# Patient Record
Sex: MALE | Race: WHITE | NOT HISPANIC OR LATINO
[De-identification: names, ages, dates, MRNs, and addresses within clinical notes are randomized per-mention and may not be internally consistent; named-entity substitution may affect disease eponyms.]

---

## 2021-03-18 ENCOUNTER — APPOINTMENT (OUTPATIENT)
Age: 51
End: 2021-03-18

## 2022-05-04 ENCOUNTER — FORM ENCOUNTER (OUTPATIENT)
Age: 52
End: 2022-05-04

## 2022-05-04 ENCOUNTER — APPOINTMENT (OUTPATIENT)
Dept: ORTHOPEDIC SURGERY | Facility: CLINIC | Age: 52
End: 2022-05-04
Payer: COMMERCIAL

## 2022-05-04 VITALS — HEIGHT: 70 IN | WEIGHT: 195 LBS | BODY MASS INDEX: 27.92 KG/M2

## 2022-05-04 DIAGNOSIS — Z78.9 OTHER SPECIFIED HEALTH STATUS: ICD-10-CM

## 2022-05-04 PROBLEM — Z00.00 ENCOUNTER FOR PREVENTIVE HEALTH EXAMINATION: Status: ACTIVE | Noted: 2022-05-04

## 2022-05-04 PROCEDURE — 73070 X-RAY EXAM OF ELBOW: CPT | Mod: RT

## 2022-05-04 PROCEDURE — 99203 OFFICE O/P NEW LOW 30 MIN: CPT

## 2022-05-04 RX ORDER — ESCITALOPRAM OXALATE 10 MG/1
10 TABLET, FILM COATED ORAL
Refills: 0 | Status: ACTIVE | COMMUNITY

## 2022-05-05 ENCOUNTER — APPOINTMENT (OUTPATIENT)
Dept: MRI IMAGING | Facility: CLINIC | Age: 52
End: 2022-05-05
Payer: COMMERCIAL

## 2022-05-05 PROCEDURE — 73221 MRI JOINT UPR EXTREM W/O DYE: CPT | Mod: RT

## 2022-05-05 NOTE — REASON FOR VISIT
[FreeTextEntry2] : 5/4/22: injured during [employer] race on Sunday. Felt a pop and some ecchymosis anterior forearm. Denies n/t. Denies hx of right elbow issues. +ice, cbd oil , and Tylenol.

## 2022-05-05 NOTE — DISCUSSION/SUMMARY
[de-identified] : The documentation recorded by the scribe accurately reflects the service I personally performed and the decisions made by me.\par I, Sony Briones, attest that this documentation has been prepared under the direction and in the presence of Provider Ned Russo MD.\par \par The patient was seen by Ned Russo MD\par

## 2022-05-05 NOTE — ASSESSMENT
[FreeTextEntry1] : 5/5/22: Xrays right elbow negative. \par no palpable muscle belly. \par STAT MRI R elbow r/o distal biceps tear. \par Referred to Dr. Mckenna for after MRI\par He may require surgical intervention. \par

## 2022-05-05 NOTE — PHYSICAL EXAM
[Orientated] : orientated [Able to Communicate] : able to communicate [Normal Skin] : normal skin [Right] : right elbow [] : light touch intact [FreeTextEntry3] : ecchymosis mild anterior forearm [de-identified] : not palpable muscle belly.

## 2022-05-05 NOTE — HISTORY OF PRESENT ILLNESS
[Sudden] : sudden [5] : 5 [4] : 4 [Dull/Aching] : dull/aching [Sharp] : sharp [Constant] : constant [] : no [FreeTextEntry5] : pt felt a pop in the arm Sunday during a [employer] Race. [FreeTextEntry7] : from the shoulder to the elbow

## 2022-05-06 ENCOUNTER — TRANSCRIPTION ENCOUNTER (OUTPATIENT)
Age: 52
End: 2022-05-06

## 2022-05-10 ENCOUNTER — APPOINTMENT (OUTPATIENT)
Dept: ORTHOPEDIC SURGERY | Facility: CLINIC | Age: 52
End: 2022-05-10
Payer: COMMERCIAL

## 2022-05-10 VITALS — BODY MASS INDEX: 27.92 KG/M2 | WEIGHT: 195 LBS | HEIGHT: 70 IN

## 2022-05-10 DIAGNOSIS — Z78.9 OTHER SPECIFIED HEALTH STATUS: ICD-10-CM

## 2022-05-10 PROCEDURE — 99214 OFFICE O/P EST MOD 30 MIN: CPT

## 2022-05-10 NOTE — ASSESSMENT
[FreeTextEntry1] : MRI Right Elbow Distal Biceps 5/5/22 [facility]\par Impression:\par 1. Acute appearing complete disruption of the biceps tendon insertion with 4-5 cm of retraction with severe \par edema, inflammatory change and soft tissue swelling within and surrounding the myotendinous complex and \par retracted tendon with volar, medial, and dorsal soft tissue swelling throughout the elbow.\par 2. Moderate grade lateral epicondylitis with partial tearing measuring 6 mm at the common extensor tendon \par insertion.\par 3. Mild medial epicondylitis.\par 4. No acute fracture, malalignment, ligament tear, effusion or loose body.\par 5. Clinical correlation is recommended.

## 2022-05-10 NOTE — PHYSICAL EXAM
[de-identified] : Neurologic: normal coordination, normal DTR UE/LE , normal sensation, normal mood and affect, orientated and able to communicate. \par Skin: normal skin, no rash, no ulcers and no lesions. \par Lymphatic: no obvious lymphadenopathy in areas examined. \par Constitutional: well developed and well nourished. \par Cardiovascular: peripheral vascular exam is grossly normal. \par Pulmonary: no respiratory distress, lungs clear to auscultation bilaterally. \par Abdomen: normal bowel sounds, non-tender, no HSM and no mass. \par \par Right Elbow: Distal biceps tenderness\par +Hook test for complete biceps tear\par +Deformity\par Swelling to anterior elbow

## 2022-05-10 NOTE — HISTORY OF PRESENT ILLNESS
[de-identified] : The patient is a 51 year old right hand dominant male who presents today complaining of right elbow pain.  \par Date of Injury/Onset: 5/1/22\par Pain:    At Rest: 0/10 \par With Activity:  5/10 \par Mechanism of injury: was doing a [employer] race and felt a pop \par This is not a Work Related Injury being treated under Worker's Compensation.\par This is not an athletic injury occurring associated with an interscholastic or organized sports team.\par Quality of symptoms: Aching & Shooting pain \par Improves with: Pain meds\par Worse with: Overuse \par Prior treatment: Saw Dr. Russo in [place] \par Prior Imaging: MRI @ [facility]\par Out of work/sport:Working\par School/Sport/Position/Occupation: sales\par Additional Information: None\par

## 2022-05-10 NOTE — DISCUSSION/SUMMARY
[Surgical risks reviewed] : Surgical risks reviewed [de-identified] : Reviewed all images with patient.\par \par Conservative treatment, nontreatment, nonsurgical intervention and surgical intervention treatment options have been reviewed with the patient.  The patient continues to be symptomatic [and has failed conservative treatment], and elects to move forward with surgical intervention.  The patient is indicated for RIGHT DISTAL BICEPS REPAIR and all indicated procedures. As such the alternatives, benefits and risks, of the above procedure, including but not limited to bleeding, infection, neurovascular injury, loss of limb, loss of life,  DVT, PE, RSD, inability to return to previous level of activity, inability to return to previous level of employment, advancement of or to osteoarthritic changes, joint instability or motion loss, hardware failure or migration, [malunion or nonunion,] failure to resolve all symptoms, failure to return to sports and need for further procedures, as well as specific risk of RE-TEAR, HO, PIN PALSY were discussed with the patient and/or their legal guardian who agreed to move forward with surgical intervention.  They have reviewed and signed the consent form today after expressing understanding of the above documented conversation. The patient or their representative will contact my office as instructed on the preoperative instruction sheet they received today to schedule surgery in a timely manner as discussed.\par \par As a post-operative protocol, I am prescribing an iceless cold/heat compression therapy device for at home use to be used 3-5 times per day at 40 degrees for 35 days as an alternative to pain medication. I would like my patient to begin with simultaneous cold & compression therapy at 10mm pressure. At the patients follow up I will determine whether they should continue with cold, or if they should transition to contrast cold/heat compression therapy. Unlike a conventional cold therapy unit that requires ice, the ThermX iceless device is set to a prescribed temperature that it will remain throughout the entire duration of use, whether that be cold compression, heat compression, or contrast compression. Cold therapy units that depend on ice melt over a very short period and do not provide compression which limits the compliance and effectiveness for pain/inflammation reduction that I am targeting for my patient. I have reached out to [employer] to supply this device as they are the exclusive provider of the ThermX and the patient will be contacted and instructed on how to utilize the device. \par \par -----------------------------------------------\par Home Exercise\par The patient is instructed on a home exercise program.\par \par KENZIE OPP Acting as a Scribe for Dr. Flores\par I, Kenzie Pop, attest that this documentation has been prepared under the direction and in the presence of Provider Louis Flores MD.\par \par Activity Modification\par The patient was advised to modify their activities.\par \par Dx / Natural History\par The patient was advised of the diagnosis.  The natural history of the pathology was explained in full to the patient in layman's terms.  Several different treatment options were discussed and explained in full to the patient including the risks and benefits of both surgical and non-surgical treatments.  All questions and concerns were answered.\par \par Pain Guide Activities\par The patient was advised to let pain guide the gradual advancement of activities.\par \par RICE\par I explained to the patient that rest, ice, compression, and elevation would benefit them.  They may return to activity after follow-up or when they no longer have any pain.

## 2022-05-16 ENCOUNTER — APPOINTMENT (OUTPATIENT)
Dept: ORTHOPEDIC SURGERY | Facility: CLINIC | Age: 52
End: 2022-05-16

## 2022-05-20 ENCOUNTER — APPOINTMENT (OUTPATIENT)
Dept: ORTHOPEDIC SURGERY | Facility: HOSPITAL | Age: 52
End: 2022-05-20
Payer: COMMERCIAL

## 2022-05-20 PROCEDURE — 24342 REPAIR OF RUPTURED TENDON: CPT | Mod: RT

## 2022-05-20 RX ORDER — DOCUSATE SODIUM 50 MG/1
50 CAPSULE, LIQUID FILLED ORAL TWICE DAILY
Qty: 15 | Refills: 0 | Status: ACTIVE | COMMUNITY
Start: 2022-05-20 | End: 1900-01-01

## 2022-05-20 RX ORDER — ONDANSETRON 4 MG/1
4 TABLET, ORALLY DISINTEGRATING ORAL
Qty: 15 | Refills: 0 | Status: ACTIVE | COMMUNITY
Start: 2022-05-20 | End: 1900-01-01

## 2022-05-20 RX ORDER — HYDROCODONE BITARTRATE AND ACETAMINOPHEN 5; 325 MG/1; MG/1
5-325 TABLET ORAL
Qty: 30 | Refills: 0 | Status: ACTIVE | COMMUNITY
Start: 2022-05-20 | End: 1900-01-01

## 2022-05-31 ENCOUNTER — APPOINTMENT (OUTPATIENT)
Dept: ORTHOPEDIC SURGERY | Facility: CLINIC | Age: 52
End: 2022-05-31
Payer: COMMERCIAL

## 2022-05-31 VITALS — BODY MASS INDEX: 27.92 KG/M2 | WEIGHT: 195 LBS | HEIGHT: 70 IN

## 2022-05-31 DIAGNOSIS — Z78.9 OTHER SPECIFIED HEALTH STATUS: ICD-10-CM

## 2022-05-31 DIAGNOSIS — S86.819A STRAIN OF OTHER MUSCLE(S) AND TENDON(S) AT LOWER LEG LEVEL, UNSPECIFIED LEG, INITIAL ENCOUNTER: ICD-10-CM

## 2022-05-31 PROCEDURE — 99024 POSTOP FOLLOW-UP VISIT: CPT

## 2022-05-31 NOTE — PHYSICAL EXAM
[de-identified] : Neurologic: normal coordination, normal DTR UE/LE , normal sensation, normal mood and affect, orientated and able to communicate. \par Skin: normal skin, no rash, no ulcers and no lesions. \par Lymphatic: no obvious lymphadenopathy in areas examined. \par Constitutional: well developed and well nourished. \par Cardiovascular: peripheral vascular exam is grossly normal. \par Pulmonary: no respiratory distress, lungs clear to auscultation bilaterally. \par Abdomen: normal bowel sounds, non-tender, no HSM and no mass.\par \par Right Elbow: No effusion, clean and dry incisions, intact skin, no fluctuance, no sign of infection, no wound erythema, no induration, no drainage. \par Sutures removed, Steri-Strips applied.

## 2022-05-31 NOTE — DISCUSSION/SUMMARY
[de-identified] : Inspected wound\par Removed sutures\par Applied steri-strips\par Reviewed all surgical images with patient and provided copies to take home\par Continue physical therapy\par Follow up in 6 weeks\par \par -----------------------------------------------\par Home Exercise\par The patient is instructed on a home exercise program.\par \par KENZIE POP Acting as a Scribe for Dr. Flores\Kenzie Coto, attest that this documentation has been prepared under the direction and in the presence of Provider Louis Flores MD.\par \par Activity Modification\par The patient was advised to modify their activities.\par \par Dx / Natural History\par The patient was advised of the diagnosis.  The natural history of the pathology was explained in full to the patient in layman's terms.  Several different treatment options were discussed and explained in full to the patient including the risks and benefits of both surgical and non-surgical treatments.  All questions and concerns were answered.\par \par Pain Guide Activities\par The patient was advised to let pain guide the gradual advancement of activities.\par \par RICE\par I explained to the patient that rest, ice, compression, and elevation would benefit them.  They may return to activity after follow-up or when they no longer have any pain. \par \par -----------------------------------------------\par Home Exercise\par The patient is instructed on a home exercise program.\par \par KENZIE POP Acting as a Scribe for Kenzie Pete, attest that this documentation has been prepared under the direction and in the presence of Provider Louis Flores MD.\par \par Activity Modification\par The patient was advised to modify their activities.\par \par Dx / Natural History\par The patient was advised of the diagnosis.  The natural history of the pathology was explained in full to the patient in layman's terms.  Several different treatment options were discussed and explained in full to the patient including the risks and benefits of both surgical and non-surgical treatments.  All questions and concerns were answered.\par \par Pain Guide Activities\par The patient was advised to let pain guide the gradual advancement of activities.\par \par RICE\par I explained to the patient that rest, ice, compression, and elevation would benefit them.  They may return to activity after follow-up or when they no longer have any pain.

## 2022-05-31 NOTE — HISTORY OF PRESENT ILLNESS
[de-identified] : The patient is 11 days s/p RIGHT BICEP REPAIR \par Date of Surgery: 5/20/22\par Pain:    At Rest: 4/10 \par With Activity:     4/10 \par Mechanism of injury: _\par This is NOT a Work Related Injury being treated under Worker's Compensation.\par This is NOT an athletic injury occurring associated with an interscholastic or organized sports team.\par Treatment/Imaging/Studies Since Last Visit:  SX \par 	Reports Available For Review Today:  NONE \par Out of work/sport: _, since _\par School/Sport/Position/Occupation: CURRENTLY WORKING FROM HOME \par Change since last visit: \par Additional Information: None\par

## 2022-07-12 ENCOUNTER — APPOINTMENT (OUTPATIENT)
Dept: ORTHOPEDIC SURGERY | Facility: CLINIC | Age: 52
End: 2022-07-12

## 2022-07-12 PROCEDURE — 99024 POSTOP FOLLOW-UP VISIT: CPT

## 2022-07-12 NOTE — DISCUSSION/SUMMARY
[de-identified] : Discontinue elbow brace\par Continue physical therapy\par rtc 6 weeks\par -----------------------------------------------\par Home Exercise\par The patient is instructed on a home exercise program.\par \par KENZIE POP Acting as a Scribe for Kenzie Pete, attest that this documentation has been prepared under the direction and in the presence of Provider Louis Flores MD.\par \par Activity Modification\par The patient was advised to modify their activities.\par \par Dx / Natural History\par The patient was advised of the diagnosis.  The natural history of the pathology was explained in full to the patient in layman's terms.  Several different treatment options were discussed and explained in full to the patient including the risks and benefits of both surgical and non-surgical treatments.  All questions and concerns were answered.\par \par Pain Guide Activities\par The patient was advised to let pain guide the gradual advancement of activities.\par \par RICE\par I explained to the patient that rest, ice, compression, and elevation would benefit them.  They may return to activity after follow-up or when they no longer have any pain. \par \par -----------------------------------------------\par Home Exercise\par The patient is instructed on a home exercise program.\par \par KENZIE POP Acting as a Scribe for Kenzie Pete, attest that this documentation has been prepared under the direction and in the presence of Provider Louis Flores MD.\par \par Activity Modification\par The patient was advised to modify their activities.\par \par Dx / Natural History\par The patient was advised of the diagnosis.  The natural history of the pathology was explained in full to the patient in layman's terms.  Several different treatment options were discussed and explained in full to the patient including the risks and benefits of both surgical and non-surgical treatments.  All questions and concerns were answered.\par \par Pain Guide Activities\par The patient was advised to let pain guide the gradual advancement of activities.\par \par RICE\par I explained to the patient that rest, ice, compression, and elevation would benefit them.  They may return to activity after follow-up or when they no longer have any pain.

## 2022-07-12 NOTE — HISTORY OF PRESENT ILLNESS
[de-identified] : The patient is s/p RIGHT BICEP REPAIR \par Date of Surgery: 5/20/22\par Pain: At Rest: 0/10 \par With Activity: 0/10 \par Mechanism of injury: _\par This is NOT a Work Related Injury being treated under Worker's Compensation.\par This is NOT an athletic injury occurring associated with an interscholastic or organized sports team.\par Treatment/Imaging/Studies Since Last Visit: xrom and PT\par 	Reports Available For Review Today: NONE \par Out of work/sport: _, since _\par School/Sport/Position/Occupation: CURRENTLY WORKING FROM HOME \par Change since last visit: \par Additional Information: None

## 2022-07-12 NOTE — PHYSICAL EXAM
[de-identified] : Neurologic: normal coordination, normal DTR UE/LE , normal sensation, normal mood and affect, orientated and able to communicate. \par Skin: normal skin, no rash, no ulcers and no lesions. \par Lymphatic: no obvious lymphadenopathy in areas examined. \par Constitutional: well developed and well nourished. \par Cardiovascular: peripheral vascular exam is grossly normal. \par Pulmonary: no respiratory distress, lungs clear to auscultation bilaterally. \par Abdomen: normal bowel sounds, non-tender, no HSM and no mass.\par \par Right Elbow: No tenderness\par Full ROM \par

## 2022-08-23 ENCOUNTER — APPOINTMENT (OUTPATIENT)
Dept: ORTHOPEDIC SURGERY | Facility: CLINIC | Age: 52
End: 2022-08-23
Payer: COMMERCIAL

## 2022-08-23 DIAGNOSIS — S46.211A STRAIN OF MUSCLE, FASCIA AND TENDON OF OTHER PARTS OF BICEPS, RIGHT ARM, INITIAL ENCOUNTER: ICD-10-CM

## 2022-08-23 DIAGNOSIS — M25.521 PAIN IN RIGHT ELBOW: ICD-10-CM

## 2022-08-23 DIAGNOSIS — S59.901A UNSPECIFIED INJURY OF RIGHT ELBOW, INITIAL ENCOUNTER: ICD-10-CM

## 2022-08-23 PROCEDURE — 99214 OFFICE O/P EST MOD 30 MIN: CPT

## 2022-08-23 PROCEDURE — 99213 OFFICE O/P EST LOW 20 MIN: CPT

## 2022-08-23 NOTE — DISCUSSION/SUMMARY
[de-identified] : Continue HEP and stretching. Advance activity as tolerated. Modify activity as discussed. Follow up PRN \par \par pt curling 20 pound dumbells pain free\par rtc prn\par \par ----------------------------------------------- \par \par MADIE SHAIKH, acting as a Scribe for Dr. Flores \par \par I, Madie Shaikh, attest that this documentation has been prepared under the direction and in the presence of Provider Louis Flores MD. \par \par \par Home Exercise \par The patient is instructed on a home exercise program. \par \par Activity Modification \par The patient was advised to modify their activities. \par \par Dx / Natural History \par The patient was advised of the diagnosis.  The natural history of the pathology was explained in full to the patient in layman's terms.  Several different treatment options were discussed and explained in full to the patient including the risks and benefits of both surgical and non-surgical treatments.  All questions and concerns were answered. \par  \par Pain Guide Activities \par The patient was advised to let pain guide the gradual advancement of activities. \par \par RICE \par I explained to the patient that rest, ice, compression, and elevation would benefit them.  They may return to activity after follow-up or when they no longer have any pain.\par

## 2022-08-23 NOTE — PHYSICAL EXAM
[de-identified] : Neurologic: normal coordination, normal DTR UE/LE , normal sensation, normal mood and affect, orientated and able to communicate. \par Skin: normal skin, no rash, no ulcers and no lesions. \par Lymphatic: no obvious lymphadenopathy in areas examined. \par Constitutional: well developed and well nourished. \par Cardiovascular: peripheral vascular exam is grossly normal. \par Pulmonary: no respiratory distress, lungs clear to auscultation bilaterally. \par Abdomen: normal bowel sounds, non-tender, no HSM and no mass.\par \par Right Elbow: No tenderness\par Full ROM \par

## 2022-08-23 NOTE — HISTORY OF PRESENT ILLNESS
[de-identified] : The patient is s/p RIGHT BICEP REPAIR \par Date of Surgery: 5/20/22\par Pain: At Rest: 0/10 \par With Activity: 0/10 \par Mechanism of injury: _\par This is NOT a Work Related Injury being treated under Worker's Compensation.\par This is NOT an athletic injury occurring associated with an interscholastic or organized sports team.\par Treatment/Imaging/Studies Since Last Visit: xrom and PT\par 	Reports Available For Review Today: NONE \par Out of work/sport: _, since _\par School/Sport/Position/Occupation: CURRENTLY WORKING FROM HOME \par Change since last visit: \par Additional Information: None

## 2023-12-09 ENCOUNTER — APPOINTMENT (OUTPATIENT)
Dept: ORTHOPEDIC SURGERY | Facility: CLINIC | Age: 53
End: 2023-12-09
Payer: COMMERCIAL

## 2023-12-09 PROCEDURE — 20552 NJX 1/MLT TRIGGER POINT 1/2: CPT

## 2023-12-09 PROCEDURE — 99204 OFFICE O/P NEW MOD 45 MIN: CPT | Mod: 25

## 2023-12-09 PROCEDURE — J3490M: CUSTOM

## 2023-12-12 ENCOUNTER — APPOINTMENT (OUTPATIENT)
Dept: PAIN MANAGEMENT | Facility: CLINIC | Age: 53
End: 2023-12-12
Payer: COMMERCIAL

## 2023-12-12 VITALS — WEIGHT: 195 LBS | HEIGHT: 70 IN | BODY MASS INDEX: 27.92 KG/M2

## 2023-12-12 PROCEDURE — 99204 OFFICE O/P NEW MOD 45 MIN: CPT

## 2023-12-20 ENCOUNTER — APPOINTMENT (OUTPATIENT)
Age: 53
End: 2023-12-20
Payer: COMMERCIAL

## 2023-12-20 PROCEDURE — 64483 NJX AA&/STRD TFRM EPI L/S 1: CPT | Mod: RT

## 2023-12-20 PROCEDURE — 64484 NJX AA&/STRD TFRM EPI L/S EA: CPT | Mod: 59,RT

## 2023-12-28 ENCOUNTER — APPOINTMENT (OUTPATIENT)
Dept: PAIN MANAGEMENT | Facility: CLINIC | Age: 53
End: 2023-12-28

## 2024-01-02 ENCOUNTER — APPOINTMENT (OUTPATIENT)
Dept: PAIN MANAGEMENT | Facility: CLINIC | Age: 54
End: 2024-01-02
Payer: COMMERCIAL

## 2024-01-02 VITALS — HEIGHT: 70 IN | WEIGHT: 195 LBS | BODY MASS INDEX: 27.92 KG/M2

## 2024-01-02 PROCEDURE — 99214 OFFICE O/P EST MOD 30 MIN: CPT

## 2024-01-02 NOTE — HISTORY OF PRESENT ILLNESS
[Lower back] : lower back [Dull/Aching] : dull/aching [Sharp] : sharp [Shooting] : shooting [Throbbing] : throbbing [Intermittent] : intermittent [Household chores] : household chores [Nothing helps with pain getting better] : Nothing helps with pain getting better [Standing] : standing [Walking] : walking [FreeTextEntry1] : 01/02/2024: s/p Right L3-4, L4-5 TFESI on 12/20/23 with >50% relief and improvement of ADLs. Pain was much better for 3 days and then started to worsen again. Back pain is feeling better but still has pain and numbness in the right lateral thigh and medial lower leg with associated numbness.   Initial HPI 12/12/2023: Pain started a few weeks ago and is on the RIGHT side of the lower back and radiates down the right anterior thigh and lower leg to the shin described as a throbbing pain. Saw Dr. Morse who recommended LESI.   MRI Lumbar Spine 15/5/23 independently reviewed: L4-5 spondy with severe stenosis; L3-4 right HNP  Conservative Care: chiropractor  Pain Medications: advil and flexeril PRN; completed MDP with little relief; Was prescribed gabapentin but did not start yet,  Past Injections: L4-5 (?right) synovial cyst removal in 2017 Spine surgery: none  Blood thinners: none [5] : 5 [] : no [FreeTextEntry7] : lt leg  [de-identified] : 7 years ago  [de-identified] : l mri

## 2024-01-02 NOTE — PHYSICAL EXAM
[de-identified] : Constitutional; Appears well, no apparent distress Ability to communicate: Normal  Respiratory: non-labored breathing Skin: No rash noted Head: Normocephalic, atraumatic Neck: no visible thyroid enlargement Eyes: Extraocular movements intact Neurologic: Alert and oriented x3 Psychiatric: normal mood, affect and behavior [] : non-antalgic

## 2024-01-09 RX ORDER — DICLOFENAC SODIUM 75 MG/1
75 TABLET, DELAYED RELEASE ORAL
Qty: 60 | Refills: 0 | Status: ACTIVE | COMMUNITY
Start: 2024-01-09 | End: 1900-01-01

## 2024-01-16 ENCOUNTER — APPOINTMENT (OUTPATIENT)
Dept: PAIN MANAGEMENT | Facility: CLINIC | Age: 54
End: 2024-01-16
Payer: COMMERCIAL

## 2024-01-16 PROCEDURE — 62323 NJX INTERLAMINAR LMBR/SAC: CPT

## 2024-01-16 PROCEDURE — A4550 SURGICAL TRAYS: CPT

## 2024-01-16 NOTE — PROCEDURE
[FreeTextEntry3] : Date of Service: 01/16/2024   Account: [id number]  Patient: KAYLAN MARR   YOB: 1970  Age: 53 year   Surgeon:                                                         Roberto Baron D.O.  Pre-Operative Diagnosis:                             Lumbosacral radiculitis  Post-Operative Diagnosis:                           Same  Procedure:                                                      Interlaminar lumbar epidural steroid injection (L3-4) under fluoroscopic guidance  Anesthesia:                                                     Local with MAC   This procedure was carried out using fluoroscopic guidance.  The risks and benefits of the procedure were discussed extensively with the patient.  The consent of the patient was obtained and the following procedure was performed.  The patient was placed in the prone position.  The lumbar area was prepped and draped in a sterile fashion.  A timeout was performed with all essential staff present and the site and side were verified. Under AP view with slight cephalad-caudad angulation, the L3-4 interspace was identified and marked.  Using sterile technique, the superficial skin was anesthetized with 1% Lidocaine without epinephrine.  A 20-gauge Tuohy needle was advanced into the epidural space under fluoroscopy using [id number] technique and using loss of resistance at the L3-4 level.  After negative aspiration for heme or CSF, an epidurogram was obtained using 2-3 cc Omnipaque contrast injected under live fluoroscopy, confirming epidural placement of the needle. Epidurogram showed no evidence of intrathecal or intravascular flow, and good evidence of bilateral epidural flow from L3-S2 levels.   After this, 4 cc of preservative free normal saline plus 12 mg of betamethasone were injected into the epidural space.  The needle was subsequently removed.  Anesthesia personnel were present throughout the procedure.  The patient tolerated the procedure well and was instructed to contact me immediately if there were any problems.  Roberto Baron D.O.

## 2024-01-30 ENCOUNTER — APPOINTMENT (OUTPATIENT)
Dept: PAIN MANAGEMENT | Facility: CLINIC | Age: 54
End: 2024-01-30

## 2024-01-30 NOTE — HISTORY OF PRESENT ILLNESS
[Lower back] : lower back [5] : 5 [Dull/Aching] : dull/aching [Sharp] : sharp [Shooting] : shooting [Throbbing] : throbbing [Intermittent] : intermittent [Household chores] : household chores [Nothing helps with pain getting better] : Nothing helps with pain getting better [Standing] : standing [Walking] : walking [FreeTextEntry1] : 01/30/2024 : s/p L3-4 LESI on 01/16/24  with 50% relief and improvement of ADLS  01/02/2024: s/p Right L3-4, L4-5 TFESI on 12/20/23 with >50% relief and improvement of ADLs. Pain was much better for 3 days and then started to worsen again. Back pain is feeling better but still has pain and numbness in the right lateral thigh and medial lower leg with associated numbness.   Initial HPI 12/12/2023: Pain started a few weeks ago and is on the RIGHT side of the lower back and radiates down the right anterior thigh and lower leg to the shin described as a throbbing pain. Saw Dr. Morse who recommended LESI.   MRI Lumbar Spine 15/5/23 independently reviewed: L4-5 spondy with severe stenosis; L3-4 right HNP  Conservative Care: chiropractor  Pain Medications: advil and flexeril PRN; completed MDP with little relief; Was prescribed gabapentin but did not start yet,  Past Injections: L4-5 (?right) synovial cyst removal in 2017 Spine surgery: none  Blood thinners: none [] : no [FreeTextEntry7] : lt leg  [de-identified] : 7 years ago  [de-identified] : l mri

## 2024-01-30 NOTE — DISCUSSION/SUMMARY
[de-identified] : After discussing various treatment options with the patient including but not limited to oral medications, physical therapy, exercise modalities as well as interventional spinal injections, we have decided with the following plan:  - Continue Home exercises, stretching, activity modification, physical therapy, and conservative care. - MRI report and/or images was reviewed and discussed with the patient. - Recommend L3-4 Lumbar Epidural Steroid Injection under fluoroscopic guidance with image. - The risks, benefits and alternatives of the proposed procedure were explained in detail with the patient. The risks outlined include but are not limited to infection, bleeding, post-dural puncture headache, nerve injury, a temporary increase in pain, failure to resolve symptoms, allergic reaction, symptom recurrence, and possible elevation of blood sugar in diabetics. All questions were answered to patient's apparent satisfaction and he/she verbalized an understanding. - Patient is presenting with acute/sub-acute radicular pain with impairment in ADLs and functionality.  The pain has not responded to conservative care including NSAID therapy and/or physical therapy.  There is no bleeding tendency, unstable medical condition, or systemic infection. - Follow up in 1-2 weeks post injection for re-evaluation.

## 2024-01-30 NOTE — PHYSICAL EXAM
[de-identified] : Constitutional; Appears well, no apparent distress Ability to communicate: Normal  Respiratory: non-labored breathing Skin: No rash noted Head: Normocephalic, atraumatic Neck: no visible thyroid enlargement Eyes: Extraocular movements intact Neurologic: Alert and oriented x3 Psychiatric: normal mood, affect and behavior [] : non-antalgic

## 2024-02-19 ENCOUNTER — APPOINTMENT (OUTPATIENT)
Dept: PAIN MANAGEMENT | Facility: CLINIC | Age: 54
End: 2024-02-19
Payer: COMMERCIAL

## 2024-02-19 VITALS — HEIGHT: 70 IN | WEIGHT: 195 LBS | BODY MASS INDEX: 27.92 KG/M2

## 2024-02-19 PROCEDURE — 99214 OFFICE O/P EST MOD 30 MIN: CPT

## 2024-02-19 NOTE — HISTORY OF PRESENT ILLNESS
[Lower back] : lower back [5] : 5 [Dull/Aching] : dull/aching [Sharp] : sharp [Shooting] : shooting [Throbbing] : throbbing [Intermittent] : intermittent [Household chores] : household chores [Nothing helps with pain getting better] : Nothing helps with pain getting better [Standing] : standing [Walking] : walking [FreeTextEntry1] : 02/19/2024: s/p L3-4 LESI on 01/16/24 with 50% relief and improvement of ADLs. Pain is now mostly radiating down the right anterior thigh. Taking advil and gabapentin and using ice.   01/02/2024: s/p Right L3-4, L4-5 TFESI on 12/20/23 with >50% relief and improvement of ADLs. Pain was much better for 3 days and then started to worsen again. Back pain is feeling better but still has pain and numbness in the right lateral thigh and medial lower leg with associated numbness.   Initial HPI 12/12/2023: Pain started a few weeks ago and is on the RIGHT side of the lower back and radiates down the right anterior thigh and lower leg to the shin described as a throbbing pain. Saw Dr. Morse who recommended LESI.   MRI Lumbar Spine 15/5/23 independently reviewed: L4-5 spondy with severe stenosis; L3-4 right HNP  Conservative Care: chiropractor  Pain Medications: advil and flexeril PRN; completed MDP with little relief; Was prescribed gabapentin but did not start yet,  Past Injections: L4-5 (?right) synovial cyst removal in 2017 Spine surgery: none  Blood thinners: none [] : no [de-identified] : 7 years ago  [FreeTextEntry7] : lt leg  [de-identified] : l mri  [TWNoteComboBox1] : 50%

## 2024-02-19 NOTE — DISCUSSION/SUMMARY
[de-identified] : After discussing various treatment options with the patient including but not limited to oral medications, physical therapy, exercise modalities as well as interventional spinal injections, we have decided with the following plan:  - Continue Home exercises, stretching, activity modification, physical therapy, and conservative care. - MRI report and/or images was reviewed and discussed with the patient. - Recommend Right L3-4, L4-5 Transforaminal Epidural Steroid Injection under fluoroscopic guidance with image. - The risks, benefits and alternatives of the proposed procedure were explained in detail with the patient. The risks outlined include but are not limited to infection, bleeding, post-dural puncture headache, nerve injury, a temporary increase in pain, failure to resolve symptoms, allergic reaction, symptom recurrence, and possible elevation of blood sugar in diabetics. All questions were answered to patient's apparent satisfaction and he/she verbalized an understanding. - Patient is presenting with acute/sub-acute radicular pain with impairment in ADLs and functionality.  The pain has not responded to conservative care including NSAID therapy and/or physical therapy.  There is no bleeding tendency, unstable medical condition, or systemic infection. - Follow up in 1-2 weeks post injection for re-evaluation.  - Recommend Ibuprofen 800mg BID PRN with food. Potential adverse effects including but not limited to bleeding, ulcers, increased risk of hypertension, heart disease, kidney disease and stroke were discussed with the patient.  Medication would allow patient to be more mobile and perform ADL's.  Will continue to monitor patient and attempt to wean as soon as possible. Will use the lowest dosage possible for the shortest possible period of time. - Recommend Cyclobenzaprine 10mg BID PRN for muscle spasms and to assist with pain relief. - Continue gabapentin BID.

## 2024-02-19 NOTE — PHYSICAL EXAM
[de-identified] : Constitutional; Appears well, no apparent distress Ability to communicate: Normal  Respiratory: non-labored breathing Skin: No rash noted Head: Normocephalic, atraumatic Neck: no visible thyroid enlargement Eyes: Extraocular movements intact Neurologic: Alert and oriented x3 Psychiatric: normal mood, affect and behavior [] : non-antalgic

## 2024-03-18 ENCOUNTER — RX RENEWAL (OUTPATIENT)
Age: 54
End: 2024-03-18

## 2024-03-19 RX ORDER — METHYLPREDNISOLONE 4 MG/1
4 TABLET ORAL
Qty: 1 | Refills: 0 | Status: ACTIVE | COMMUNITY
Start: 2024-03-19 | End: 1900-01-01

## 2024-03-26 RX ORDER — METHYLPREDNISOLONE 4 MG/1
4 TABLET ORAL
Qty: 1 | Refills: 0 | Status: ACTIVE | COMMUNITY
Start: 2024-03-26 | End: 1900-01-01

## 2024-04-01 ENCOUNTER — APPOINTMENT (OUTPATIENT)
Dept: PAIN MANAGEMENT | Facility: CLINIC | Age: 54
End: 2024-04-01

## 2024-04-11 ENCOUNTER — RX RENEWAL (OUTPATIENT)
Age: 54
End: 2024-04-11

## 2024-04-15 ENCOUNTER — RX RENEWAL (OUTPATIENT)
Age: 54
End: 2024-04-15

## 2024-04-16 ENCOUNTER — APPOINTMENT (OUTPATIENT)
Dept: PAIN MANAGEMENT | Facility: CLINIC | Age: 54
End: 2024-04-16

## 2024-04-26 ENCOUNTER — APPOINTMENT (OUTPATIENT)
Dept: ORTHOPEDIC SURGERY | Facility: CLINIC | Age: 54
End: 2024-04-26
Payer: COMMERCIAL

## 2024-04-26 VITALS — BODY MASS INDEX: 27.92 KG/M2 | WEIGHT: 195 LBS | HEIGHT: 70 IN

## 2024-04-26 DIAGNOSIS — M54.16 RADICULOPATHY, LUMBAR REGION: ICD-10-CM

## 2024-04-26 DIAGNOSIS — M47.816 SPONDYLOSIS W/OUT MYELOPATHY OR RADICULOPATHY, LUMBAR REGION: ICD-10-CM

## 2024-04-26 DIAGNOSIS — M51.26 OTHER INTERVERTEBRAL DISC DISPLACEMENT, LUMBAR REGION: ICD-10-CM

## 2024-04-26 DIAGNOSIS — M43.16 SPONDYLOLISTHESIS, LUMBAR REGION: ICD-10-CM

## 2024-04-26 PROCEDURE — 99215 OFFICE O/P EST HI 40 MIN: CPT

## 2024-04-26 NOTE — HISTORY OF PRESENT ILLNESS
[6] : 6 [Intermittent] : intermittent [de-identified] : 4/26/24:  He return for follow up, increased RLE subjective leg weakness, numbness and tingling.  Increased pain.  Gabapentin was not effective. Increased limitations in function/ADL's.  Wears ice packed strapped on for commute to [employer].  He has not been able to play tennis/racquetball.  Would like to discuss surgery more.   TFESI L3-4 1/16/24 with 75% relief back and RLE symptoms x one day, 50% relief for 2 days, then pain recurred. R L3-4, L4-5 TESI with 50% relief of back and leg symptoms x 3 days, then pain recurred.    12/9/23:  52 yo M with lower back pain and down the right leg - from the lower back - left leg is okay   He saw chiro  tried oral steroid and muscle relaxer  No LESI so far   Had right sided synovial cyst removed about 5 years ago - done by outside doctor and did well with that   MRi L spine - Grade 1 anterolisthesis of L4 on L5 secondary to chronic bilateral L4 spondylolysis. Edema involving the L4-L5 articular facets reflecting stress reaction. Modic type I endplate changes at T12-L1 and L1-L2. At T12-L1: Disc bulge without canal stenosis or neural foraminal narrowing. At L1-2: Diffuse disc bulge with posterior osseous ridging resulting in mild spinal canal stenosis and mild bilateral neural foraminal narrowing. At L3-4: Disc bulge with a superimposed right foraminal disc herniation resulting in mild spinal canal stenosis and mild right-sided neural foraminal narrowing. At L4-5: Grade 1 anterolisthesis with uncovering of the intervertebral disc. There is a superimposed left lateralizing disc herniation with advanced facet arthrosis resulting in moderate to severe spinal canal stenosis and severe left and mild right-sided neural foraminal narrowing.  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 Has had two LESI with no relief. would liek to discuss options today. has done chiro and stretching. has taken gabapentin.  [FreeTextEntry7] : down the right leg [de-identified] : MRI

## 2024-04-26 NOTE — PHYSICAL EXAM
[Right lower extremity below knee] : right lower extremity below knee [Right lower extremity above knee] : right lower extremity above knee [] : patient ambulates without assistive device [Able to Communicate] : able to communicate [Well Developed] : well developed [Well Nourished] : well nourished [5___] : left extensor hallicus longus 5[unfilled]/5 [2___] : right quadriceps 2[unfilled]/5 [de-identified] : has to keep moving during exam, unable to sit comfortably

## 2024-04-26 NOTE — DISCUSSION/SUMMARY
[Surgical risks reviewed] : Surgical risks reviewed [de-identified] : reviewed the case and the imaging with him  L4-5 facet arthopathy with prior cyst excision - now with spondylolisthesis/stenosis at l4-5 and persistent stenosis at the level discussion of tx optoins   gabapentin hes tried since last visit without relief  surgery would be lami/fusion at l4-5   would need xray prior to surgery   d/w the patient for laminecotomy and/or fusion at L4-5   We've discussed the surgery details including instrumentation and grafting options (local, allograft, ICBG, and biologics) as well as potential for complications including but not limited to pain, scar and infection. There is also a possibility for hardware complication such as malposition of hardware,hardware loosening, pullout, failure or fracture of bone, adjacent segment disease,pseudarthrosis, and need for future surgery. We discussed potential for injury to nerves, spinal cord or blood vessels, paralysis, blindness, need for transfusion, general anesthesia, allergic reaction, prolonged intubation,myocardial infarction, stroke, deep venous thrombosis, pulmonary embolus, and death.  Spinal fluid leak may occur and may require prolonged time in the hospital and also further surgical procedures including drain placement.  The patient understands these things and all questions are answered to his/her satisfaction.  Patient has been instructed to stop all Aspirin and NSAIDs 10 days prior to surgery date. For Coumadin and other blood thinners, the patient is referred to the medical doctor  The patient will need a medical clearance and pre-admission testing prior to surgery We will use neuromonitoring in order to keep things as safe as possilble. The procedure does not come with a guarantee of success or of satisfaction on the patient's behalf  At the surgeon's discretion he may call for assistance during the surgery or in the perioperative period   Hes going to think about surgery  questions answered  discussed the limitations of surgery as well

## 2024-05-01 ENCOUNTER — RX RENEWAL (OUTPATIENT)
Age: 54
End: 2024-05-01

## 2024-05-01 RX ORDER — GABAPENTIN 300 MG/1
300 CAPSULE ORAL
Qty: 180 | Refills: 0 | Status: ACTIVE | COMMUNITY
Start: 2024-01-11 | End: 1900-01-01

## 2024-05-13 ENCOUNTER — RX RENEWAL (OUTPATIENT)
Age: 54
End: 2024-05-13

## 2024-05-30 ENCOUNTER — RX RENEWAL (OUTPATIENT)
Age: 54
End: 2024-05-30

## 2024-05-30 RX ORDER — IBUPROFEN 800 MG/1
800 TABLET, FILM COATED ORAL
Qty: 60 | Refills: 0 | Status: ACTIVE | COMMUNITY
Start: 2024-02-19 | End: 1900-01-01

## 2024-06-06 ENCOUNTER — RX RENEWAL (OUTPATIENT)
Age: 54
End: 2024-06-06

## 2024-06-06 RX ORDER — CYCLOBENZAPRINE HYDROCHLORIDE 10 MG/1
10 TABLET, FILM COATED ORAL
Qty: 30 | Refills: 1 | Status: ACTIVE | COMMUNITY
Start: 2024-01-11 | End: 1900-01-01

## 2024-07-09 ENCOUNTER — RX RENEWAL (OUTPATIENT)
Age: 54
End: 2024-07-09

## 2024-07-15 ENCOUNTER — TRANSCRIPTION ENCOUNTER (OUTPATIENT)
Age: 54
End: 2024-07-15

## 2024-07-15 VITALS
SYSTOLIC BLOOD PRESSURE: 121 MMHG | WEIGHT: 191.8 LBS | RESPIRATION RATE: 16 BRPM | HEIGHT: 70 IN | TEMPERATURE: 99 F | HEART RATE: 73 BPM | DIASTOLIC BLOOD PRESSURE: 81 MMHG

## 2024-07-15 RX ORDER — POVIDONE-IODINE
1 FLAKES (GRAM) MISCELLANEOUS ONCE
Refills: 0 | Status: COMPLETED | OUTPATIENT
Start: 2024-07-16 | End: 2024-07-16

## 2024-07-15 NOTE — ASU PATIENT PROFILE, ADULT - FALL HARM RISK - UNIVERSAL INTERVENTIONS
Bed in lowest position, wheels locked, appropriate side rails in place/Call bell, personal items and telephone in reach/Instruct patient to call for assistance before getting out of bed or chair/Non-slip footwear when patient is out of bed/[place] to call system/Physically safe environment - no spills, clutter or unnecessary equipment/Purposeful Proactive Rounding/Room/bathroom lighting operational, light cord in reach

## 2024-07-15 NOTE — ASU PATIENT PROFILE, ADULT - NSICDXPASTSURGICALHX_GEN_ALL_CORE_FT
PAST SURGICAL HISTORY:  Elective surgery cyst removal from the back    Elective surgery bicepts tendon repair    H/O eye surgery left    History of dental surgery     S/P rotator cuff repair left

## 2024-07-15 NOTE — ASU PATIENT PROFILE, ADULT - NSICDXPASTMEDICALHX_GEN_ALL_CORE_FT
PAST MEDICAL HISTORY:  H/O: depression     Lower back pain     TAMMIE on CPAP     Spinal stenosis      PAST MEDICAL HISTORY:  H/O: depression     HTN (hypertension)     Lower back pain     TAMMIE on CPAP     Spinal stenosis

## 2024-07-15 NOTE — H&P ADULT - NSHPPHYSICALEXAM_GEN_ALL_CORE
MSK:  decreased ROM lumbar spine 2/2 pain  Remainder of physical exam as per medical clearance note MSK:  decreased ROM lumbar spine 2/2 pain  Skin warm and well perfused, no visible wounds/erythema/ecchymoses  EHL/FHL/TA/GS 5/5 motor strength bilateral lower extremities   SLT in tact to distal bilateral lower extremities   DP pulses palpable bilaterally   Remainder of physical exam as per medical clearance note

## 2024-07-15 NOTE — H&P ADULT - NSHPLABSRESULTS_GEN_ALL_CORE
preop h/h 13.8/39.1  UA - within normal limits, reviewed by medical clearance  ekg SR within normal limits, reviewed by medical clearance   Cr 0.85 preop   Povidone iodine nasal swab to be given day of surgery

## 2024-07-15 NOTE — H&P ADULT - NSICDXPASTMEDICALHX_GEN_ALL_CORE_FT
PAST MEDICAL HISTORY:  H/O: depression     Lower back pain     TAMMEI on CPAP     Spinal stenosis

## 2024-07-15 NOTE — H&P ADULT - HISTORY OF PRESENT ILLNESS
54M with back pain x   Presents for elective L4-5 ALIF/L4-5 posterior spinal fusion  54M with back pain x chronic. Pt reports right sided low back pain that radiates down his right lower extremity. He denies numbness/tingling of his low back however reports "throbbing" pain. He takes ibuprofen, tylenol, flexeril, and gabapentin as needed for pain control. He does not ambulate with an assistive device at baseline. Denies DVT hx; denies CP, SOB, N/V, tactile fevers, calf pain.    Presents for elective L4-5 ALIF/L4-5 posterior spinal fusion

## 2024-07-15 NOTE — H&P ADULT - PROBLEM SELECTOR PLAN 1
Admit to Orthopaedic Service.  Presents today for elective L4-5 ALIF/PSF   Pt medically stable and cleared for procedure today by Dr. Haskins

## 2024-07-16 ENCOUNTER — INPATIENT (INPATIENT)
Facility: HOSPITAL | Age: 54
LOS: 1 days | Discharge: HOME | End: 2024-07-18
Attending: ORTHOPAEDIC SURGERY | Admitting: ORTHOPAEDIC SURGERY
Payer: COMMERCIAL

## 2024-07-16 DIAGNOSIS — M43.10 SPONDYLOLISTHESIS, SITE UNSPECIFIED: ICD-10-CM

## 2024-07-16 DIAGNOSIS — Z98.890 OTHER SPECIFIED POSTPROCEDURAL STATES: Chronic | ICD-10-CM

## 2024-07-16 DIAGNOSIS — Z41.9 ENCOUNTER FOR PROCEDURE FOR PURPOSES OTHER THAN REMEDYING HEALTH STATE, UNSPECIFIED: Chronic | ICD-10-CM

## 2024-07-16 DIAGNOSIS — G47.33 OBSTRUCTIVE SLEEP APNEA (ADULT) (PEDIATRIC): ICD-10-CM

## 2024-07-16 DIAGNOSIS — Z92.89 PERSONAL HISTORY OF OTHER MEDICAL TREATMENT: Chronic | ICD-10-CM

## 2024-07-16 DIAGNOSIS — Z86.59 PERSONAL HISTORY OF OTHER MENTAL AND BEHAVIORAL DISORDERS: ICD-10-CM

## 2024-07-16 LAB
BLD GP AB SCN SERPL QL: NEGATIVE — SIGNIFICANT CHANGE UP
RH IG SCN BLD-IMP: POSITIVE — SIGNIFICANT CHANGE UP

## 2024-07-16 DEVICE — IMPLANTABLE DEVICE: Type: IMPLANTABLE DEVICE | Status: FUNCTIONAL

## 2024-07-16 DEVICE — CLIP APPLIER ETHICON LIGACLIP 11.5" MEDIUM: Type: IMPLANTABLE DEVICE | Status: FUNCTIONAL

## 2024-07-16 DEVICE — GRAFT BONE INFUSE KIT SM: Type: IMPLANTABLE DEVICE | Status: FUNCTIONAL

## 2024-07-16 DEVICE — K-WIRE MICROAIRE (SMOOTH) DOUBLE TROCAR 1.6MM X 9": Type: IMPLANTABLE DEVICE | Status: FUNCTIONAL

## 2024-07-16 DEVICE — SURGIFLO HEMOSTATIC MATRIX KIT: Type: IMPLANTABLE DEVICE | Status: FUNCTIONAL

## 2024-07-16 DEVICE — CAP LOKG CREO: Type: IMPLANTABLE DEVICE | Status: FUNCTIONAL

## 2024-07-16 DEVICE — SURGIFOAM PAD 8CM X 12.5CM X 10MM (100): Type: IMPLANTABLE DEVICE | Status: FUNCTIONAL

## 2024-07-16 RX ORDER — ENOXAPARIN SODIUM 100 MG/ML
40 INJECTION SUBCUTANEOUS EVERY 24 HOURS
Refills: 0 | Status: DISCONTINUED | OUTPATIENT
Start: 2024-07-17 | End: 2024-07-18

## 2024-07-16 RX ORDER — DEXAMETHASONE 1 MG/1
4 TABLET ORAL EVERY 6 HOURS
Refills: 0 | Status: COMPLETED | OUTPATIENT
Start: 2024-07-16 | End: 2024-07-17

## 2024-07-16 RX ORDER — OLMESARTAN MEDOXOMIL AND HYDROCHLOROTHIAZIDE 40; 12.5 MG/1; MG/1
1 TABLET, FILM COATED ORAL
Refills: 0 | DISCHARGE

## 2024-07-16 RX ORDER — HYDROMORPHONE HCL 0.2 MG/ML
0.5 INJECTION, SOLUTION INTRAVENOUS EVERY 4 HOURS
Refills: 0 | Status: DISCONTINUED | OUTPATIENT
Start: 2024-07-16 | End: 2024-07-18

## 2024-07-16 RX ORDER — HYDROMORPHONE HCL 0.2 MG/ML
0.5 INJECTION, SOLUTION INTRAVENOUS
Refills: 0 | Status: DISCONTINUED | OUTPATIENT
Start: 2024-07-16 | End: 2024-07-18

## 2024-07-16 RX ORDER — ONDANSETRON HYDROCHLORIDE 2 MG/ML
4 INJECTION INTRAMUSCULAR; INTRAVENOUS EVERY 6 HOURS
Refills: 0 | Status: DISCONTINUED | OUTPATIENT
Start: 2024-07-16 | End: 2024-07-18

## 2024-07-16 RX ORDER — ACETAMINOPHEN 325 MG
1000 TABLET ORAL EVERY 8 HOURS
Refills: 0 | Status: DISCONTINUED | OUTPATIENT
Start: 2024-07-16 | End: 2024-07-18

## 2024-07-16 RX ORDER — ESCITALOPRAM OXALATE 20 MG/1
10 TABLET, FILM COATED ORAL DAILY
Refills: 0 | Status: DISCONTINUED | OUTPATIENT
Start: 2024-07-16 | End: 2024-07-18

## 2024-07-16 RX ORDER — DEXTROSE MONOHYDRATE AND SODIUM CHLORIDE 5; .3 G/100ML; G/100ML
1000 INJECTION, SOLUTION INTRAVENOUS
Refills: 0 | Status: DISCONTINUED | OUTPATIENT
Start: 2024-07-16 | End: 2024-07-18

## 2024-07-16 RX ORDER — ESCITALOPRAM OXALATE 20 MG/1
1 TABLET, FILM COATED ORAL
Refills: 0 | DISCHARGE

## 2024-07-16 RX ORDER — BENZOCAINE AND MENTHOL 15; 3.6 MG/1; MG/1
1 LOZENGE ORAL
Refills: 0 | Status: DISCONTINUED | OUTPATIENT
Start: 2024-07-16 | End: 2024-07-18

## 2024-07-16 RX ORDER — PREGABALIN 50 MG/1
50 CAPSULE ORAL EVERY 8 HOURS
Refills: 0 | Status: DISCONTINUED | OUTPATIENT
Start: 2024-07-16 | End: 2024-07-18

## 2024-07-16 RX ORDER — OXYCODONE HYDROCHLORIDE 100 MG/5ML
10 SOLUTION ORAL EVERY 4 HOURS
Refills: 0 | Status: DISCONTINUED | OUTPATIENT
Start: 2024-07-16 | End: 2024-07-18

## 2024-07-16 RX ORDER — APREPITANT 125MG-80MG
40 KIT ORAL ONCE
Refills: 0 | Status: COMPLETED | OUTPATIENT
Start: 2024-07-16 | End: 2024-07-16

## 2024-07-16 RX ORDER — OXYCODONE HYDROCHLORIDE 100 MG/5ML
5 SOLUTION ORAL EVERY 6 HOURS
Refills: 0 | Status: DISCONTINUED | OUTPATIENT
Start: 2024-07-16 | End: 2024-07-18

## 2024-07-16 RX ORDER — METHOCARBAMOL 500 MG
500 TABLET ORAL EVERY 8 HOURS
Refills: 0 | Status: DISCONTINUED | OUTPATIENT
Start: 2024-07-16 | End: 2024-07-18

## 2024-07-16 RX ORDER — SENNOSIDES 8.6 MG
2 TABLET ORAL AT BEDTIME
Refills: 0 | Status: DISCONTINUED | OUTPATIENT
Start: 2024-07-16 | End: 2024-07-18

## 2024-07-16 RX ORDER — CEFAZOLIN 10 G/1
2000 INJECTION, POWDER, FOR SOLUTION INTRAVENOUS EVERY 8 HOURS
Refills: 0 | Status: COMPLETED | OUTPATIENT
Start: 2024-07-16 | End: 2024-07-17

## 2024-07-16 RX ORDER — POLYETHYLENE GLYCOL 3350 1 G/G
17 POWDER ORAL DAILY
Refills: 0 | Status: DISCONTINUED | OUTPATIENT
Start: 2024-07-16 | End: 2024-07-18

## 2024-07-16 RX ADMIN — DEXAMETHASONE 4 MILLIGRAM(S): 1 TABLET ORAL at 17:55

## 2024-07-16 RX ADMIN — Medication 500 MILLIGRAM(S): at 22:08

## 2024-07-16 RX ADMIN — Medication 2 TABLET(S): at 22:09

## 2024-07-16 RX ADMIN — DEXTROSE MONOHYDRATE AND SODIUM CHLORIDE 100 MILLILITER(S): 5; .3 INJECTION, SOLUTION INTRAVENOUS at 22:08

## 2024-07-16 RX ADMIN — CEFAZOLIN 100 MILLIGRAM(S): 10 INJECTION, POWDER, FOR SOLUTION INTRAVENOUS at 16:45

## 2024-07-16 RX ADMIN — PREGABALIN 50 MILLIGRAM(S): 50 CAPSULE ORAL at 22:08

## 2024-07-16 RX ADMIN — APREPITANT 40 MILLIGRAM(S): KIT at 07:41

## 2024-07-16 RX ADMIN — Medication 1000 MILLIGRAM(S): at 23:00

## 2024-07-16 RX ADMIN — Medication 1000 MILLIGRAM(S): at 22:08

## 2024-07-16 RX ADMIN — Medication 1 APPLICATION(S): at 07:39

## 2024-07-16 RX ADMIN — DEXTROSE MONOHYDRATE AND SODIUM CHLORIDE 100 MILLILITER(S): 5; .3 INJECTION, SOLUTION INTRAVENOUS at 15:31

## 2024-07-16 RX ADMIN — Medication 1 APPLICATION(S): at 08:45

## 2024-07-16 NOTE — BRIEF OPERATIVE NOTE - NSICDXBRIEFPROCEDURE_GEN_ALL_CORE_FT
PROCEDURES:  Oblique lateral interbody fusion (OLIF) of lumbar spine 16-Jul-2024 12:17:23 L4-5 HoscottielYenny  Fusion, posterior spinal column, lumbar, percutaneous 16-Jul-2024 12:18:58 L4-5 Yenny Sanchez  
PROCEDURES:  Oblique lateral interbody fusion (OLIF) of lumbar spine 16-Jul-2024 12:17:23 L4-5 HoscottielYenny  Fusion, posterior spinal column, lumbar, percutaneous 16-Jul-2024 12:18:58 L4-5 Yenny Sanchez

## 2024-07-16 NOTE — PHYSICAL THERAPY INITIAL EVALUATION ADULT - THERAPY FREQUENCY, PT EVAL
Patient educated on frequency of inpatient physical therapy at [facility], patient verbalized understanding./daily

## 2024-07-16 NOTE — PROGRESS NOTE ADULT - SUBJECTIVE AND OBJECTIVE BOX
Ortho Post Op Check    Procedure: OLIF/PSF L4-5  Surgeon: Dr. Mathews    Pt comfortable without complaints, pain controlled  Denies CP, SOB, N/V, numbness/tingling     Vital Signs Last 24 Hrs  T(C): 37.2 (07-16-24 @ 14:00), Max: 37.2 (07-16-24 @ 14:00)  T(F): 99 (07-16-24 @ 14:00), Max: 99 (07-16-24 @ 14:00)  HR: 96 (07-16-24 @ 14:45) (84 - 96)  BP: 109/58 (07-16-24 @ 14:45) (104/58 - 111/55)  BP(mean): 76 (07-16-24 @ 14:45) (75 - 78)  RR: 11 (07-16-24 @ 14:45) (6 - 11)  SpO2: 97% (07-16-24 @ 14:45) (92% - 98%)  I&O's Summary      General: Pt Alert and oriented, NAD  DSG C/D/I left flank and back  Pulses intact b/l LE  Sensation intact b/l   Motor: Quad/Ham/EHL/FHL/TA/GS 5/5 b/l    yepez in place    A/P: 54yMale POD#0 s/p OLIF/PSF L4-5  - Stable  - Pain Control  - DVT ppx: scds  - Post op abx: ancef  - PT, WBS: wbat    Ortho Pager [phone]

## 2024-07-16 NOTE — PRE-ANESTHESIA EVALUATION ADULT - NSANTHPEFT_GEN_ALL_CORE
General: AAOx3, NAD  Eyes: The sclera and conjunctiva normal, pupils equal in size.  ENT: The ears and nose were normal in appearance; oropharynx clear, moist mucus membranes.  Neck: The appearance of the neck was normal, with no gross masses or nodules.  Respiratory: Unlabored, no retractions.  CV: RRR.  Neurological: No focal deficit, moves all extremities.  Exercise Tolerance:  METS>4.

## 2024-07-16 NOTE — PHYSICAL THERAPY INITIAL EVALUATION ADULT - MANUAL MUSCLE TESTING RESULTS, REHAB EVAL
Grossly assessed with functional movement, bilateral UE/LE greater than or equal to 3+/5 // B ankle PF/DF 5/5

## 2024-07-16 NOTE — BRIEF OPERATIVE NOTE - NSICDXBRIEFPREOP_GEN_ALL_CORE_FT
PRE-OP DIAGNOSIS:  Spondylolisthesis, site unspecified 16-Jul-2024 12:21:36  Yenny Sanchez  
PRE-OP DIAGNOSIS:  Spondylolisthesis, site unspecified 16-Jul-2024 12:21:36  Yenny Sanchez

## 2024-07-16 NOTE — BRIEF OPERATIVE NOTE - OPERATION/FINDINGS
see above
Patient brought to the OR, right lateral decubitus positioning. Incision planed w/ fluoroscopic guidance. Oblique incision. Dissection through anterior fascia, external oblique, internal oblique. Peritoneum swept downwards. L4-L5 disk space exposed via typical L5-S1 exposure. Middle sacral vein stapled. Anterior fascia, subdermal and skin closed. EBL ~100 cc. See orthopedics documentation for additional details of procedure.

## 2024-07-16 NOTE — PROGRESS NOTE ADULT - SUBJECTIVE AND OBJECTIVE BOX
S: Seen in PACU. Had been up with PT; reports good strength mobility. Good pain control.     O: AFVSS. Vitals in chart reviewed.    General: Awake, well appearing. comfortable.   CV: HDS, WWP  Pulm: Room air  abd: abdominal incision covered in gauze / tegaderm bandage. No drains.   Ext: 5/5 proximal strength. 2+ DP/PT b/l. No edema. SCDs in place.     A/P: 54M s/p OLIF in expected post-operative condition.     - Diet per primary  - Vascular surgery (Team 3) will continue to follow. Please call if we can be of assistance. The phone number is [phone] and we can be reached there 24/7.

## 2024-07-16 NOTE — PHYSICAL THERAPY INITIAL EVALUATION ADULT - ADDITIONAL COMMENTS
Pt reports living in house with 2 ZARIA, then 1 FOS inside, with wife and daughter. Reports being independent with daily activities without use of DME.

## 2024-07-16 NOTE — BRIEF OPERATIVE NOTE - NSICDXBRIEFPOSTOP_GEN_ALL_CORE_FT
POST-OP DIAGNOSIS:  Spondylolisthesis, site unspecified 16-Jul-2024 12:21:28  Yenny Sanchez  
POST-OP DIAGNOSIS:  Spondylolisthesis, site unspecified 16-Jul-2024 12:21:28  Yenny Sanchez

## 2024-07-16 NOTE — PHYSICAL THERAPY INITIAL EVALUATION ADULT - PERTINENT HX OF CURRENT PROBLEM, REHAB EVAL
54M with back pain x chronic. Pt reports right sided low back pain that radiates down his right lower extremity. He denies numbness/tingling of his low back however reports "throbbing" pain. He takes ibuprofen, tylenol, flexeril, and gabapentin as needed for pain control. He does not ambulate with an assistive device at baseline. Denies DVT hx; denies CP, SOB, N/V, tactile fevers, calf pain.

## 2024-07-16 NOTE — PHYSICAL THERAPY INITIAL EVALUATION ADULT - GENERAL OBSERVATIONS, REHAB EVAL
PT IE completed. Chart reviewed. Pt ambulated 5 side steps in each direction, 10 feet x 1 with RW and CG. Pt received semi-supine, NAD, +IV heplock, +tele, +yepez, +back and abdominal dressing C/D/I. CALOS Crystal cleared pt for PT.

## 2024-07-16 NOTE — PROGRESS NOTE ADULT - SUBJECTIVE AND OBJECTIVE BOX
Vascular Surgery Post-Op Note    Procedure: ALIF    Diagnosis/Indication:  Back pain    Surgeon: Dr. Aranda and Alexander    S: Pt has no complaints. Denies severe back pain, abdominal pain, CP, SOB, MAYER, calf tenderness. Pain controlled with medication.    O:  T(C): 36.4 (07-16-24 @ 16:15), Max: 37.2 (07-16-24 @ 14:00)  T(F): 97.6 (07-16-24 @ 16:15), Max: 99 (07-16-24 @ 14:00)  HR: 84 (07-16-24 @ 16:47) (84 - 106)  BP: 113/56 (07-16-24 @ 16:47) (104/58 - 117/58)  RR: 11 (07-16-24 @ 16:47) (6 - 18)  SpO2: 95% (07-16-24 @ 16:47) (92% - 98%)  Wt(kg): --            Gen: NAD, resting comfortably in bed  C/V: NSR  Pulm: Nonlabored breathing, no respiratory distress  Abd: soft, NT, mildly distended. Incisions dressed with tefla, tegederm c/d/i  Extrem: WWP, no calf edema, SCDs in place  Vascular: 2+ palpable pulses distally      A/P: 54yMale s/p above procedure  -Per primary team

## 2024-07-16 NOTE — PRE-ANESTHESIA EVALUATION ADULT - HEIGHT IN CM
Spoke with pt   She is currently trached and using a passe andrea valve to speak  Pt states she is feel very good; back still hurts but \"nothing like it did\"  Pt stated her last dose of IV ATB was yesterday  Pt states she is anxious to go home and is not aware of any follow up appts or imagining needed to be completed  Last MRI was in 5/22 177.8

## 2024-07-16 NOTE — PROGRESS NOTE ADULT - SUBJECTIVE AND OBJECTIVE BOX
Ortho Post Op Check    Procedure:L4-5 OLIF/PSF  Surgeon: Bisi Pastor comfortable without complaints, pain controlled  Denies CP, SOB, N/V, numbness/tingling     Vital Signs Last 24 Hrs  T(C): 37.1 (07-16-24 @ 20:50), Max: 37.1 (07-16-24 @ 20:50)  T(F): 98.8 (07-16-24 @ 20:50), Max: 98.8 (07-16-24 @ 20:50)  HR: 89 (07-16-24 @ 20:50) (83 - 89)  BP: 135/75 (07-16-24 @ 20:50) (119/69 - 135/75)  BP(mean): --  RR: 19 (07-16-24 @ 20:50) (18 - 19)  SpO2: 95% (07-16-24 @ 20:50) (95% - 99%)  I&O's Summary    16 Jul 2024 07:01  -  16 Jul 2024 23:20  --------------------------------------------------------  IN: 640 mL / OUT: 610 mL / NET: 30 mL        General: Pt Alert and oriented, NAD  DSG C/D/I  Pulses: 2+  Sensation: SILT  Motor: 5/5 EHL/FHL/TA/GS                A/P: 54yMale POD#0 s/p L4-5 OLIF/PSF   - Stable  - Pain Control  - DVT ppx: SCDs  - Post op abx: ancef  - PT, WBS: WBAT  - Pending PT  -FU AM labs    Ortho Pager [phone]

## 2024-07-17 ENCOUNTER — TRANSCRIPTION ENCOUNTER (OUTPATIENT)
Age: 54
End: 2024-07-17

## 2024-07-17 LAB
ANION GAP SERPL CALC-SCNC: 10 MMOL/L — SIGNIFICANT CHANGE UP (ref 5–17)
BASOPHILS # BLD AUTO: 0.01 K/UL — SIGNIFICANT CHANGE UP (ref 0–0.2)
BASOPHILS NFR BLD AUTO: 0.1 % — SIGNIFICANT CHANGE UP (ref 0–2)
BUN SERPL-MCNC: 15 MG/DL — SIGNIFICANT CHANGE UP (ref 7–23)
CALCIUM SERPL-MCNC: 9.1 MG/DL — SIGNIFICANT CHANGE UP (ref 8.4–10.5)
CHLORIDE SERPL-SCNC: 103 MMOL/L — SIGNIFICANT CHANGE UP (ref 96–108)
CO2 SERPL-SCNC: 25 MMOL/L — SIGNIFICANT CHANGE UP (ref 22–31)
CREAT SERPL-MCNC: 0.79 MG/DL — SIGNIFICANT CHANGE UP (ref 0.5–1.3)
EGFR: 106 ML/MIN/1.73M2 — SIGNIFICANT CHANGE UP
EOSINOPHIL # BLD AUTO: 0 K/UL — SIGNIFICANT CHANGE UP (ref 0–0.5)
EOSINOPHIL NFR BLD AUTO: 0 % — SIGNIFICANT CHANGE UP (ref 0–6)
GLUCOSE SERPL-MCNC: 130 MG/DL — HIGH (ref 70–99)
HCT VFR BLD CALC: 33.6 % — LOW (ref 39–50)
HGB BLD-MCNC: 12.4 G/DL — LOW (ref 13–17)
IMM GRANULOCYTES NFR BLD AUTO: 0.9 % — SIGNIFICANT CHANGE UP (ref 0–0.9)
LYMPHOCYTES # BLD AUTO: 0.88 K/UL — LOW (ref 1–3.3)
LYMPHOCYTES # BLD AUTO: 5.6 % — LOW (ref 13–44)
MCHC RBC-ENTMCNC: 34.3 PG — HIGH (ref 27–34)
MCHC RBC-ENTMCNC: 36.9 GM/DL — HIGH (ref 32–36)
MCV RBC AUTO: 92.8 FL — SIGNIFICANT CHANGE UP (ref 80–100)
MONOCYTES # BLD AUTO: 0.81 K/UL — SIGNIFICANT CHANGE UP (ref 0–0.9)
MONOCYTES NFR BLD AUTO: 5.2 % — SIGNIFICANT CHANGE UP (ref 2–14)
NEUTROPHILS # BLD AUTO: 13.79 K/UL — HIGH (ref 1.8–7.4)
NEUTROPHILS NFR BLD AUTO: 88.2 % — HIGH (ref 43–77)
NRBC # BLD: 0 /100 WBCS — SIGNIFICANT CHANGE UP (ref 0–0)
PLATELET # BLD AUTO: 191 K/UL — SIGNIFICANT CHANGE UP (ref 150–400)
POTASSIUM SERPL-MCNC: 4.4 MMOL/L — SIGNIFICANT CHANGE UP (ref 3.5–5.3)
POTASSIUM SERPL-SCNC: 4.4 MMOL/L — SIGNIFICANT CHANGE UP (ref 3.5–5.3)
RBC # BLD: 3.62 M/UL — LOW (ref 4.2–5.8)
RBC # FLD: 12 % — SIGNIFICANT CHANGE UP (ref 10.3–14.5)
SODIUM SERPL-SCNC: 138 MMOL/L — SIGNIFICANT CHANGE UP (ref 135–145)
WBC # BLD: 15.63 K/UL — HIGH (ref 3.8–10.5)
WBC # FLD AUTO: 15.63 K/UL — HIGH (ref 3.8–10.5)

## 2024-07-17 PROCEDURE — 99222 1ST HOSP IP/OBS MODERATE 55: CPT

## 2024-07-17 RX ORDER — DOCUSATE SODIUM 100 MG/1
1 CAPSULE, LIQUID FILLED ORAL
Qty: 5 | Refills: 0
Start: 2024-07-17 | End: 2024-07-21

## 2024-07-17 RX ORDER — METHOCARBAMOL 500 MG
1 TABLET ORAL
Qty: 21 | Refills: 0
Start: 2024-07-17 | End: 2024-07-23

## 2024-07-17 RX ORDER — SENNOSIDES 8.6 MG
2 TABLET ORAL
Qty: 0 | Refills: 0 | DISCHARGE
Start: 2024-07-17

## 2024-07-17 RX ORDER — OXYCODONE HYDROCHLORIDE 100 MG/5ML
1 SOLUTION ORAL
Qty: 30 | Refills: 0
Start: 2024-07-17 | End: 2024-07-21

## 2024-07-17 RX ORDER — CEPHALEXIN 500 MG
1 CAPSULE ORAL
Qty: 21 | Refills: 0
Start: 2024-07-17 | End: 2024-07-23

## 2024-07-17 RX ORDER — POLYETHYLENE GLYCOL 3350 1 G/G
17 POWDER ORAL EVERY 24 HOURS
Refills: 0 | Status: DISCONTINUED | OUTPATIENT
Start: 2024-07-17 | End: 2024-07-18

## 2024-07-17 RX ORDER — SENNOSIDES 8.6 MG
2 TABLET ORAL
Qty: 30 | Refills: 0
Start: 2024-07-17 | End: 2024-07-31

## 2024-07-17 RX ORDER — PREGABALIN 50 MG/1
1 CAPSULE ORAL
Qty: 21 | Refills: 0
Start: 2024-07-17 | End: 2024-07-23

## 2024-07-17 RX ORDER — ACETAMINOPHEN 325 MG
2 TABLET ORAL
Qty: 0 | Refills: 0 | DISCHARGE

## 2024-07-17 RX ORDER — CELECOXIB 100 MG/1
1 CAPSULE ORAL
Qty: 30 | Refills: 0
Start: 2024-07-17 | End: 2024-07-31

## 2024-07-17 RX ORDER — CELECOXIB 100 MG/1
200 CAPSULE ORAL EVERY 12 HOURS
Refills: 0 | Status: DISCONTINUED | OUTPATIENT
Start: 2024-07-17 | End: 2024-07-18

## 2024-07-17 RX ADMIN — DEXAMETHASONE 4 MILLIGRAM(S): 1 TABLET ORAL at 01:16

## 2024-07-17 RX ADMIN — Medication 2 TABLET(S): at 22:00

## 2024-07-17 RX ADMIN — HYDROMORPHONE HCL 0.5 MILLIGRAM(S): 0.2 INJECTION, SOLUTION INTRAVENOUS at 01:17

## 2024-07-17 RX ADMIN — POLYETHYLENE GLYCOL 3350 17 GRAM(S): 1 POWDER ORAL at 11:27

## 2024-07-17 RX ADMIN — PREGABALIN 50 MILLIGRAM(S): 50 CAPSULE ORAL at 13:40

## 2024-07-17 RX ADMIN — CELECOXIB 200 MILLIGRAM(S): 100 CAPSULE ORAL at 19:54

## 2024-07-17 RX ADMIN — PREGABALIN 50 MILLIGRAM(S): 50 CAPSULE ORAL at 06:29

## 2024-07-17 RX ADMIN — DEXAMETHASONE 4 MILLIGRAM(S): 1 TABLET ORAL at 11:27

## 2024-07-17 RX ADMIN — Medication 500 MILLIGRAM(S): at 06:28

## 2024-07-17 RX ADMIN — HYDROMORPHONE HCL 0.5 MILLIGRAM(S): 0.2 INJECTION, SOLUTION INTRAVENOUS at 02:00

## 2024-07-17 RX ADMIN — Medication 1000 MILLIGRAM(S): at 07:14

## 2024-07-17 RX ADMIN — Medication 500 MILLIGRAM(S): at 13:40

## 2024-07-17 RX ADMIN — Medication 1000 MILLIGRAM(S): at 14:21

## 2024-07-17 RX ADMIN — CEFAZOLIN 100 MILLIGRAM(S): 10 INJECTION, POWDER, FOR SOLUTION INTRAVENOUS at 01:17

## 2024-07-17 RX ADMIN — ENOXAPARIN SODIUM 40 MILLIGRAM(S): 100 INJECTION SUBCUTANEOUS at 22:00

## 2024-07-17 RX ADMIN — Medication 500 MILLIGRAM(S): at 22:00

## 2024-07-17 RX ADMIN — Medication 1 TABLET(S): at 11:27

## 2024-07-17 RX ADMIN — PREGABALIN 50 MILLIGRAM(S): 50 CAPSULE ORAL at 22:00

## 2024-07-17 RX ADMIN — Medication 1000 MILLIGRAM(S): at 13:40

## 2024-07-17 RX ADMIN — Medication 1000 MILLIGRAM(S): at 22:00

## 2024-07-17 RX ADMIN — ESCITALOPRAM OXALATE 10 MILLIGRAM(S): 20 TABLET, FILM COATED ORAL at 11:27

## 2024-07-17 RX ADMIN — CELECOXIB 200 MILLIGRAM(S): 100 CAPSULE ORAL at 19:05

## 2024-07-17 RX ADMIN — Medication 1000 MILLIGRAM(S): at 06:28

## 2024-07-17 RX ADMIN — DEXAMETHASONE 4 MILLIGRAM(S): 1 TABLET ORAL at 06:29

## 2024-07-17 NOTE — DISCHARGE NOTE PROVIDER - CARE PROVIDERS DIRECT ADDRESSES
[email or web address]

## 2024-07-17 NOTE — DISCHARGE NOTE PROVIDER - PROVIDER TOKENS
PROVIDER:[TOKEN:[[id number]:[id number]],FOLLOWUP:[2 weeks]] PROVIDER:[TOKEN:[[id number]:[id number]],FOLLOWUP:[2 weeks]],PROVIDER:[TOKEN:[[id number]]]

## 2024-07-17 NOTE — PROGRESS NOTE ADULT - SUBJECTIVE AND OBJECTIVE BOX
DAILY PROGRESS NOTE    S: Patient examined bedside, NAC. Denied back pain, abdominal pain. Tolerating diet. No trouble with ambulation. States his shooting pains have resolved.    O:     T(C): 36.7 (07-17-24 @ 09:10), Max: 37.2 (07-16-24 @ 14:00)  HR: 79 (07-17-24 @ 09:10) (77 - 106)  BP: 113/61 (07-17-24 @ 09:10) (104/58 - 137/76)  RR: 18 (07-17-24 @ 09:10) (6 - 19)  SpO2: 96% (07-17-24 @ 09:10) (92% - 100%)    Physical Exam:   General: Awake, well appearing. comfortable.   CV: HDS, WWP  Pulm: Room air  abd: abdominal incision covered in gauze / tegaderm bandage. No drains.   Ext: 5/5 proximal strength. 2+ DP/PT b/l. No edema. SCDs in place.                                Labs:     LABS:  cret                        12.4   15.63 )-----------( 191      ( 17 Jul 2024 05:30 )             33.6     07-17    138  |  103  |  15  ----------------------------<  130<H>  4.4   |  25  |  0.79    Ca    9.1      17 Jul 2024 05:30              A/P: 54M s/p OLIF in expected post-operative condition.   - Care per primary  - Vascular will sign-off

## 2024-07-17 NOTE — PROGRESS NOTE ADULT - SUBJECTIVE AND OBJECTIVE BOX
HPI:  54M PMH depression, HTN, TAMMIE (on CPAP), admitted for L4-5 ALIF/L4-5 posterior spinal fusion in setting of chronic low back pain with radiation down RLE. Patient currently POD#1 and doing well, tolerated procedure without significantcomplication. Patient reporting pain is 4/10 today, participating with PT when I saw him. Orthopedics following closely. Patient denies any chest pain, SOB, fevers, chills, or any other acutely concerning symptoms.    ROS  Negative except as indicated in the HPI       VITAL SIGNS:  T(F): 98 (07-17-24 @ 09:10)  HR: 85 (07-17-24 @ 10:05)  BP: 113/61 (07-17-24 @ 09:10)  RR: 18 (07-17-24 @ 10:05)  SpO2: 97% (07-17-24 @ 10:05)  Wt(kg): --    PHYSICAL EXAM:  GENERAL: NAD, A&Ox3  HEENT: Atraumatic, no icterus, EOMI, no gross thyromegaly  CARDIAC: NSR, +S1, +S2, no orthopnea  PULM: CTA B/L  ABD: NTND, no easily palpable organomegaly  SKIN: No significant ecchymosis, petechiae or other skin abnormalities  EXTREMITIES: No LE edema  MSK: low back appropriately TTP, expected post-op ROM limitation  NEURO: No focal neurologic signs       MEDICATIONS  (STANDING):  acetaminophen     Tablet .. 1000 milliGRAM(s) Oral every 8 hours  enoxaparin Injectable 40 milliGRAM(s) SubCutaneous every 24 hours  escitalopram 10 milliGRAM(s) Oral daily  lactated ringers. 1000 milliLiter(s) (100 mL/Hr) IV Continuous <Continuous>  methocarbamol 500 milliGRAM(s) Oral every 8 hours  multivitamin 1 Tablet(s) Oral daily  polyethylene glycol 3350 17 Gram(s) Oral daily  pregabalin 50 milliGRAM(s) Oral every 8 hours  senna 2 Tablet(s) Oral at bedtime    MEDICATIONS  (PRN):  benzocaine/menthol Lozenge 1 Lozenge Oral four times a day PRN Sore Throat  HYDROmorphone  Injectable 0.5 milliGRAM(s) IV Push every 4 hours PRN breakthrough on the floor  HYDROmorphone  Injectable 0.5 milliGRAM(s) IV Push every 15 minutes PRN breakthrough in PACU  ondansetron Injectable 4 milliGRAM(s) IV Push every 6 hours PRN Nausea and/or Vomiting  oxyCODONE    IR 5 milliGRAM(s) Oral every 6 hours PRN Moderate Pain (4 - 6)  oxyCODONE    IR 10 milliGRAM(s) Oral every 4 hours PRN Severe Pain (7 - 10)      Allergies    No Known Allergies    Intolerances        LABS:                        12.4   15.63 )-----------( 191      ( 17 Jul 2024 05:30 )             33.6     07-17    138  |  103  |  15  ----------------------------<  130<H>  4.4   |  25  |  0.79    Ca    9.1      17 Jul 2024 05:30        Urinalysis Basic - ( 17 Jul 2024 05:30 )    Color: x / Appearance: x / SG: x / pH: x  Gluc: 130 mg/dL / Ketone: x  / Bili: x / Urobili: x   Blood: x / Protein: x / Nitrite: x   Leuk Esterase: x / RBC: x / WBC x   Sq Epi: x / Non Sq Epi: x / Bacteria: x        RADIOLOGY & ADDITIONAL TESTS:

## 2024-07-17 NOTE — DISCHARGE NOTE PROVIDER - NSDCCPCAREPLAN_GEN_ALL_CORE_FT
PRINCIPAL DISCHARGE DIAGNOSIS  Diagnosis: Spondylolisthesis, site unspecified  Assessment and Plan of Treatment:

## 2024-07-17 NOTE — DISCHARGE NOTE PROVIDER - CARE PROVIDER_API CALL
Charlie Mathews  Orthopaedic Surgery  [address] [place] [address]  Phone: [phone]  Fax: [phone]  Follow Up Time: 2 weeks   Charlie Mathews  Orthopaedic Surgery  [address] [address]  Phone: [phone]  Fax: [phone]  Follow Up Time: 2 weeks    Oscar Aranda  Vascular Surgery  [address] [address]  Phone: [phone]  Fax: [phone]  Follow Up Time:

## 2024-07-17 NOTE — PROGRESS NOTE ADULT - SUBJECTIVE AND OBJECTIVE BOX
patient lying in bed, family at bedside  pre-op right leg pain resolved  walked well with PT - had what sounds like a vasovagal episode, now resolved  no CP, sob, N,V    A&O  abd soft  dressings CDI  calves soft, no sign of DVT    plan:  1. wbat, PT  2. likely home tomorrow  3. f/u as sched  4. lovenox to start tonight

## 2024-07-17 NOTE — DISCHARGE NOTE PROVIDER - NSDCCPTREATMENT_GEN_ALL_CORE_FT
PRINCIPAL PROCEDURE  Procedure: Oblique lateral interbody fusion (OLIF) of lumbar spine  Findings and Treatment: L4-5      SECONDARY PROCEDURE  Procedure: Fusion, posterior spinal column, lumbar, percutaneous  Findings and Treatment: L4-5

## 2024-07-17 NOTE — DISCHARGE NOTE PROVIDER - HOSPITAL COURSE
Admit to Orthopaedics  Surgery L4-5 oblique lumbar interbody fusion and posterior spinal fusion on 7/17 with Dr. Mathews and Dr. Aranda  Perioperative Antibiotics  DVT prophylaxis  Physical Therapy and out of bed to chair  Pain Management  Medicine Comanagement

## 2024-07-17 NOTE — DISCHARGE NOTE PROVIDER - NSDCFUADDINST_GEN_ALL_CORE_FT
ACTIVITY:  - No extreme bending, extending, turning, twisting, or straining. No strenuous activity, heavy lifting, driving or returning to work until cleared by MD.    DRESSING:   (STAPLES/SUTURES/STERI-STRIPS)   -Change dressing daily until post-op day 5. Sponge bathe until post-op day 5 then may take full shower. Once dressing removed keep incision clean and dry. Do not pick at your incision. Do not apply creams, ointments or oils to your incision until cleared by your surgeon. Do not soak your incision in sitting water (ie tubs, pools, lakes, etc.) until cleared by your surgeon.    MEDICATION/ANTICOAGULATION:  - You have been prescribed medications for pain:    - Tylenol (Acetaminophen) for mild to moderate pain. Do not exceed 3,000mg daily.    - For more severe pain, you may continue to take the Tylenol with the addition of narcotic pain medication. Take this medication as prescribed. Do not take more than prescribed. Note that this medication may cause drowsiness or dizziness. Do not operate machinery. This medication may cause constipation.  - If you have been prescribed a muscle relaxer, take this medication as needed for muscle spasm. Follow instructions on bottle.  - Try to have regular bowel movements. Take stool softener or laxative if necessary. You may wish to take Miralax daily until you have regular bowel movements.   - Do not take antiinflammatories (Aleve, Advil, Naproxen, Ibuprofen, etc.) until cleared by your surgeon. Tylenol is not an anti-inflammatory and okay to take (see above).  - If you have a pain management physician, please follow-up with them postoperatively.   - If you experience any negative side effects of your medications, please call your surgeon's office to discuss.    Follow-up:  - Call to schedule an appt with Dr. Mathews for follow up. If you have staples or sutures they will be removed in office.  - Please follow-up with your primary care physician or any other specialist you see postoperatively, if needed.     - Contact your doctor if you experience: fever greater than 101.5, chills, chest pain, difficulty breathing, redness or excessive drainage around the incision, other concerns.

## 2024-07-17 NOTE — PROGRESS NOTE ADULT - SUBJECTIVE AND OBJECTIVE BOX
Ortho Note    Pt comfortable without complaints, pain controlled  Denies CP, SOB, N/V, numbness/tingling     Vital Signs Last 24 Hrs  T(C): 36.6 (07-17-24 @ 05:15), Max: 36.6 (07-17-24 @ 05:15)  T(F): 97.9 (07-17-24 @ 05:15), Max: 97.9 (07-17-24 @ 05:15)  HR: 84 (07-17-24 @ 06:26) (77 - 84)  BP: 137/76 (07-17-24 @ 05:15) (137/76 - 137/76)  BP(mean): --  RR: 18 (07-17-24 @ 06:26) (18 - 19)  SpO2: 96% (07-17-24 @ 06:26) (96% - 96%)  I&O's Summary    16 Jul 2024 07:01  -  17 Jul 2024 06:57  --------------------------------------------------------  IN: 640 mL / OUT: 1410 mL / NET: -770 mL        General: Pt Alert and oriented, NAD  DSG C/D/I  Pulses: 2+  Bilat LE    Sensation: SILT L2-S1   Motor: SILT   L2 (hip flexion)  5/5    L3 (knee extension)  5/5    L4 (ankle dorsiflexion)  5/5    L5 (long toe extension) 5/5    S1 (ankle plantar flexion) 4/5               A/P: 54yMale s/p L4-5 OLIF/PSF 7/16  - Stable  - Pain Control  - DVT ppx: SCDs  - Post op abx: ancef  - PT, WBS: WBAT  Dispo: Pending PT    Ortho Pager [phone] Ortho Note    Pt comfortable without complaints, pain controlled  Denies CP, SOB, N/V, numbness/tingling     Vital Signs Last 24 Hrs  T(C): 36.6 (07-17-24 @ 05:15), Max: 36.6 (07-17-24 @ 05:15)  T(F): 97.9 (07-17-24 @ 05:15), Max: 97.9 (07-17-24 @ 05:15)  HR: 84 (07-17-24 @ 06:26) (77 - 84)  BP: 137/76 (07-17-24 @ 05:15) (137/76 - 137/76)  BP(mean): --  RR: 18 (07-17-24 @ 06:26) (18 - 19)  SpO2: 96% (07-17-24 @ 06:26) (96% - 96%)  I&O's Summary    16 Jul 2024 07:01  -  17 Jul 2024 06:57  --------------------------------------------------------  IN: 640 mL / OUT: 1410 mL / NET: -770 mL        General: Pt Alert and oriented, NAD  DSG C/D/I  Pulses: 2+  Bilat LE    Sensation: SILT L2-S1   Motor: SILT   L2 (hip flexion)  5/5    L3 (knee extension)  5/5    L4 (ankle dorsiflexion)  5/5    L5 (long toe extension) 5/5    S1 (ankle plantar flexion) 4/5               A/P: 54yMale s/p L4-5 OLIF/PSF 7/16  - Stable  - Pain Control  - DVT ppx: SCDs  - Post op abx: ancef  - PT, WBS: WBAT  Dispo: Home no PT needs    Ortho Pager [phone]

## 2024-07-17 NOTE — PROGRESS NOTE ADULT - ASSESSMENT
54M PMH depression, HTN, TAMMIE (on CPAP), admitted for L4-5 ALIF/L4-5 posterior spinal fusion in setting of chronic low back pain with radiation down RLE.       L4-5 ALIF/L4-5 posterior spinal fusion POD#1  – Patient tolerated procedure without issue  – Continue with pain/bowel regimen as ordered by orthopedics  – Continue working with PT and advance activity as tolerated  – Continue Lyrica 50 mg every 8 hours  – Presently vitally stable, labs not acutely concerning, expected postop stress/steroid induced leukocytosis and mild postop anemia which can be followed on repeat labs      Hx Depression  – Continue home Lexapro 10 mg daily      Hx HTN  – Home HCTZ currently on hold, blood pressures currently controlled; trend pressures and if elevated will consider resuming home medication      Hx TAMMIE  – Continue home CPAP      DVT PPx: Lovenox Per Ortho   GI PPx: Per Ortho   Diet: Per Ortho   Code: Full  Dispo: Per Ortho     Patient seen and examined by me. This note was generated using [employer] enhanced voice recognition software. As such, there may be inadvertent typographical errors. Please feel free to contact me for clarification.

## 2024-07-17 NOTE — DISCHARGE NOTE PROVIDER - NSDCMRMEDTOKEN_GEN_ALL_CORE_FT
Benicar HCT 20 mg-12.5 mg oral tablet: 1 tab(s) orally once a day  Lexapro 10 mg oral tablet: 1 tab(s) orally once a day  methocarbamol 500 mg oral tablet: 1 tab(s) orally every 8 hours as needed for  muscle spasm MDD: 3  oxyCODONE 5 mg oral tablet: 1 tab(s) orally every 4 to 6 hours as needed for  severe pain MDD: 4  pregabalin 50 mg oral capsule: 1 cap(s) orally every 8 hours MDD: 3  senna leaf extract oral tablet: 2 tab(s) orally once a day (at bedtime)  Tylenol 500 mg oral tablet: 2 tab(s) orally every 8 hours   Benicar HCT 20 mg-12.5 mg oral tablet: 1 tab(s) orally once a day  CeleBREX 200 mg oral capsule: 1 cap(s) orally every 12 hours MDD: 2  cephalexin 500 mg oral tablet: 1 tab(s) orally every 8 hours  Colace 100 mg oral capsule: 1 cap(s) orally once a day (at bedtime) as needed for  constipation  Lexapro 10 mg oral tablet: 1 tab(s) orally once a day  methocarbamol 500 mg oral tablet: 1 tab(s) orally every 8 hours as needed for  muscle spasm MDD: 3  oxyCODONE 5 mg oral tablet: 1 tab(s) orally every 4 to 6 hours as needed for  severe pain MDD: 4  senna leaf extract oral tablet: 2 tab(s) orally once a day (at bedtime) as needed for  constipation  Tylenol 500 mg oral tablet: 2 tab(s) orally every 8 hours

## 2024-07-18 ENCOUNTER — TRANSCRIPTION ENCOUNTER (OUTPATIENT)
Age: 54
End: 2024-07-18

## 2024-07-18 VITALS — OXYGEN SATURATION: 97 % | HEART RATE: 87 BPM | SYSTOLIC BLOOD PRESSURE: 128 MMHG | DIASTOLIC BLOOD PRESSURE: 81 MMHG

## 2024-07-18 LAB
ANION GAP SERPL CALC-SCNC: 8 MMOL/L — SIGNIFICANT CHANGE UP (ref 5–17)
BUN SERPL-MCNC: 14 MG/DL — SIGNIFICANT CHANGE UP (ref 7–23)
CALCIUM SERPL-MCNC: 9.2 MG/DL — SIGNIFICANT CHANGE UP (ref 8.4–10.5)
CHLORIDE SERPL-SCNC: 101 MMOL/L — SIGNIFICANT CHANGE UP (ref 96–108)
CO2 SERPL-SCNC: 29 MMOL/L — SIGNIFICANT CHANGE UP (ref 22–31)
CREAT SERPL-MCNC: 0.83 MG/DL — SIGNIFICANT CHANGE UP (ref 0.5–1.3)
EGFR: 104 ML/MIN/1.73M2 — SIGNIFICANT CHANGE UP
GLUCOSE SERPL-MCNC: 90 MG/DL — SIGNIFICANT CHANGE UP (ref 70–99)
HCT VFR BLD CALC: 36.4 % — LOW (ref 39–50)
HGB BLD-MCNC: 13.1 G/DL — SIGNIFICANT CHANGE UP (ref 13–17)
MCHC RBC-ENTMCNC: 34.4 PG — HIGH (ref 27–34)
MCHC RBC-ENTMCNC: 36 GM/DL — SIGNIFICANT CHANGE UP (ref 32–36)
MCV RBC AUTO: 95.5 FL — SIGNIFICANT CHANGE UP (ref 80–100)
NRBC # BLD: 0 /100 WBCS — SIGNIFICANT CHANGE UP (ref 0–0)
PLATELET # BLD AUTO: 182 K/UL — SIGNIFICANT CHANGE UP (ref 150–400)
POTASSIUM SERPL-MCNC: 4 MMOL/L — SIGNIFICANT CHANGE UP (ref 3.5–5.3)
POTASSIUM SERPL-SCNC: 4 MMOL/L — SIGNIFICANT CHANGE UP (ref 3.5–5.3)
RBC # BLD: 3.81 M/UL — LOW (ref 4.2–5.8)
RBC # FLD: 11.7 % — SIGNIFICANT CHANGE UP (ref 10.3–14.5)
SODIUM SERPL-SCNC: 138 MMOL/L — SIGNIFICANT CHANGE UP (ref 135–145)
WBC # BLD: 11.2 K/UL — HIGH (ref 3.8–10.5)
WBC # FLD AUTO: 11.2 K/UL — HIGH (ref 3.8–10.5)

## 2024-07-18 PROCEDURE — 85027 COMPLETE CBC AUTOMATED: CPT

## 2024-07-18 PROCEDURE — 86901 BLOOD TYPING SEROLOGIC RH(D): CPT

## 2024-07-18 PROCEDURE — 36415 COLL VENOUS BLD VENIPUNCTURE: CPT

## 2024-07-18 PROCEDURE — 97161 PT EVAL LOW COMPLEX 20 MIN: CPT

## 2024-07-18 PROCEDURE — C1889: CPT

## 2024-07-18 PROCEDURE — 80048 BASIC METABOLIC PNL TOTAL CA: CPT

## 2024-07-18 PROCEDURE — 76000 FLUOROSCOPY <1 HR PHYS/QHP: CPT

## 2024-07-18 PROCEDURE — 85025 COMPLETE CBC W/AUTO DIFF WBC: CPT

## 2024-07-18 PROCEDURE — 86900 BLOOD TYPING SEROLOGIC ABO: CPT

## 2024-07-18 PROCEDURE — 86850 RBC ANTIBODY SCREEN: CPT

## 2024-07-18 PROCEDURE — 94660 CPAP INITIATION&MGMT: CPT

## 2024-07-18 PROCEDURE — 97116 GAIT TRAINING THERAPY: CPT

## 2024-07-18 PROCEDURE — C1713: CPT

## 2024-07-18 RX ADMIN — POLYETHYLENE GLYCOL 3350 17 GRAM(S): 1 POWDER ORAL at 11:52

## 2024-07-18 RX ADMIN — CELECOXIB 200 MILLIGRAM(S): 100 CAPSULE ORAL at 05:06

## 2024-07-18 RX ADMIN — PREGABALIN 50 MILLIGRAM(S): 50 CAPSULE ORAL at 05:07

## 2024-07-18 RX ADMIN — Medication 1 TABLET(S): at 11:51

## 2024-07-18 RX ADMIN — Medication 500 MILLIGRAM(S): at 05:07

## 2024-07-18 RX ADMIN — Medication 1000 MILLIGRAM(S): at 05:06

## 2024-07-18 RX ADMIN — ESCITALOPRAM OXALATE 10 MILLIGRAM(S): 20 TABLET, FILM COATED ORAL at 11:51

## 2024-07-18 NOTE — PROGRESS NOTE ADULT - SUBJECTIVE AND OBJECTIVE BOX
Ortho Note    Pt comfortable without complaints, pain controlled  Denies CP, SOB, N/V, numbness/tingling     Vital Signs Last 24 Hrs  T(C): 36.8 (07-18-24 @ 08:37), Max: 36.8 (07-18-24 @ 08:37)  T(F): 98.3 (07-18-24 @ 08:37), Max: 98.3 (07-18-24 @ 08:37)  HR: 70 (07-18-24 @ 09:43) (69 - 72)  BP: 112/69 (07-18-24 @ 08:37) (112/69 - 124/78)  BP(mean): --  RR: 18 (07-18-24 @ 08:37) (16 - 18)  SpO2: 97% (07-18-24 @ 09:43) (97% - 100%)  I&O's Summary    17 Jul 2024 07:01  -  18 Jul 2024 07:00  --------------------------------------------------------  IN: 0 mL / OUT: 2200 mL / NET: -2200 mL        General: Pt Alert and oriented, NAD  DSG C/D/I  Pulses: 2+  Bilat LE    Sensation: SILT L2-S1   Motor: SILT   L2 (hip flexion)  5/5    L3 (knee extension)  5/5    L4 (ankle dorsiflexion)  5/5    L5 (long toe extension) 5/5    S1 (ankle plantar flexion) 5/5                           12.4   15.63 )-----------( 191      ( 17 Jul 2024 05:30 )             33.6     07-17    138  |  103  |  15  ----------------------------<  130<H>  4.4   |  25  |  0.79    Ca    9.1      17 Jul 2024 05:30      A/P: 54yMale s/p L4-5 OLIF/PSF 7/16  - Stable  - Pain Control  - DVT ppx: SCDs  - Post op abx: ancef  - PT, WBS: WBAT  Dispo: Home no PT needs most likely today    Ortho Pager [phone]

## 2024-07-18 NOTE — DISCHARGE NOTE NURSING/CASE MANAGEMENT/SOCIAL WORK - PATIENT PORTAL LINK FT
You can access the FollowMyHealth Patient Portal offered by [facility] by registering at the following website: [email or web address]. By joining [employer]s FollowMyHealth portal, you will also be able to view your health information using other applications (apps) compatible with our system.

## 2024-07-18 NOTE — PROGRESS NOTE ADULT - PROVIDER SPECIALTY LIST ADULT
Orthopedics
Vascular Surgery
Orthopedics
Vascular Surgery
Vascular Surgery
Hospitalist
Orthopedics

## 2024-07-23 DIAGNOSIS — T38.0X5A ADVERSE EFFECT OF GLUCOCORTICOIDS AND SYNTHETIC ANALOGUES, INITIAL ENCOUNTER: ICD-10-CM

## 2024-07-23 DIAGNOSIS — F32.A DEPRESSION, UNSPECIFIED: ICD-10-CM

## 2024-07-23 DIAGNOSIS — I10 ESSENTIAL (PRIMARY) HYPERTENSION: ICD-10-CM

## 2024-07-23 DIAGNOSIS — M43.16 SPONDYLOLISTHESIS, LUMBAR REGION: ICD-10-CM

## 2024-07-23 DIAGNOSIS — Z99.89 DEPENDENCE ON OTHER ENABLING MACHINES AND DEVICES: ICD-10-CM

## 2024-07-23 DIAGNOSIS — G47.33 OBSTRUCTIVE SLEEP APNEA (ADULT) (PEDIATRIC): ICD-10-CM

## 2024-07-23 DIAGNOSIS — D72.828 OTHER ELEVATED WHITE BLOOD CELL COUNT: ICD-10-CM

## 2024-07-23 DIAGNOSIS — D64.89 OTHER SPECIFIED ANEMIAS: ICD-10-CM

## 2024-07-23 DIAGNOSIS — M48.061 SPINAL STENOSIS, LUMBAR REGION WITHOUT NEUROGENIC CLAUDICATION: ICD-10-CM

## 2024-07-23 DIAGNOSIS — G89.29 OTHER CHRONIC PAIN: ICD-10-CM

## 2024-08-09 ENCOUNTER — RX RENEWAL (OUTPATIENT)
Age: 54
End: 2024-08-09

## 2024-08-20 ENCOUNTER — RX RENEWAL (OUTPATIENT)
Age: 54
End: 2024-08-20

## 2024-09-16 ENCOUNTER — RX RENEWAL (OUTPATIENT)
Age: 54
End: 2024-09-16

## 2024-09-26 ENCOUNTER — RX RENEWAL (OUTPATIENT)
Age: 54
End: 2024-09-26

## 2024-10-01 ENCOUNTER — RX RENEWAL (OUTPATIENT)
Age: 54
End: 2024-10-01

## 2024-10-28 ENCOUNTER — RX RENEWAL (OUTPATIENT)
Age: 54
End: 2024-10-28

## 2024-11-25 ENCOUNTER — RX RENEWAL (OUTPATIENT)
Age: 54
End: 2024-11-25

## 2024-12-09 ENCOUNTER — RX RENEWAL (OUTPATIENT)
Age: 54
End: 2024-12-09

## 2025-01-20 ENCOUNTER — RX RENEWAL (OUTPATIENT)
Age: 55
End: 2025-01-20

## 2025-02-24 ENCOUNTER — RX RENEWAL (OUTPATIENT)
Age: 55
End: 2025-02-24

## 2025-03-24 ENCOUNTER — RX RENEWAL (OUTPATIENT)
Age: 55
End: 2025-03-24

## 2025-04-04 ENCOUNTER — RX RENEWAL (OUTPATIENT)
Age: 55
End: 2025-04-04

## 2025-04-24 ENCOUNTER — RX RENEWAL (OUTPATIENT)
Age: 55
End: 2025-04-24

## 2025-06-04 ENCOUNTER — RX RENEWAL (OUTPATIENT)
Age: 55
End: 2025-06-04

## 2025-07-31 ENCOUNTER — RX RENEWAL (OUTPATIENT)
Age: 55
End: 2025-07-31

## (undated) DEVICE — DRAPE GENERAL ENDOSCOPY

## (undated) DEVICE — DRAPE 3/4 SHEET 52X76"

## (undated) DEVICE — ELCTR BOVIE TIP BLADE VALLEYLAB 6.5"

## (undated) DEVICE — BLADE SURGICAL #15 CARBON

## (undated) DEVICE — SUT PROLENE 5-0 36" RB-1

## (undated) DEVICE — CLIPPER BLADE GENERAL USE

## (undated) DEVICE — GLV 7.5 PROTEXIS (WHITE)

## (undated) DEVICE — VENODYNE/SCD SLEEVE CALF MEDIUM

## (undated) DEVICE — Device

## (undated) DEVICE — DRAPE FOR 2 TIER TABLE W/ 8" BACK 72" LONG

## (undated) DEVICE — PACK SPINE

## (undated) DEVICE — SUT VICRYL 2-0 27" CT-1 UNDYED

## (undated) DEVICE — SUT VICRYL 0 27" CT-1

## (undated) DEVICE — GLV 8 SENSICARE W ALOE

## (undated) DEVICE — BIT DRILL REAM GPS 3.5MM

## (undated) DEVICE — DRAPE LIGHT HANDLE COVER (BLUE)

## (undated) DEVICE — SET BOWL XTRA 225

## (undated) DEVICE — SYR LUER LOK 20CC

## (undated) DEVICE — BLADE SURGICAL #11 CARBON

## (undated) DEVICE — ELCTR AQUAMANTYS BIPOLAR SEALER 6.0

## (undated) DEVICE — RESERVOIR XTRA B BLD COLLECT

## (undated) DEVICE — SPONGE ENDO PEANUT 5MM

## (undated) DEVICE — DRILL BIT GLOBUS MEDICAL HS EXCELSIUS GPS 4.5MM

## (undated) DEVICE — SUT SILK 0 18" TIES

## (undated) DEVICE — GOWN TRIMAX XXL

## (undated) DEVICE — MIDAS REX MR8 BALL FLUTED LG BORE 5MM X 14CM

## (undated) DEVICE — SUT VICRYL 2-0 27" SH UNDYED

## (undated) DEVICE — SUT MONOCRYL 3-0 18" PS-2 UNDYED

## (undated) DEVICE — WARMING BLANKET UPPER ADULT

## (undated) DEVICE — SPONGE PEANUT AUTO COUNT

## (undated) DEVICE — NDL SPINAL 18G X 3.5" (PINK)

## (undated) DEVICE — DRAPE MAYO STAND 30"

## (undated) DEVICE — NEPTUNE II 4-PORT MANIFOLD

## (undated) DEVICE — DRSG STERISTRIPS 0.25 X 4"

## (undated) DEVICE — CANISTER SPECIMEN CONVERTOR PLASTIC

## (undated) DEVICE — NDL HYPO SAFE 22G X 1.5" (BLACK)

## (undated) DEVICE — PREP CHLOROHEXIDINE 4% 118CC KIT

## (undated) DEVICE — SUT MONOCRYL 3-0 18" PS-1

## (undated) DEVICE — MIDAS REX MR8 BALL FLUTED LG BORE 5MM X 9CM

## (undated) DEVICE — TUBING SMOKE EVAC 3/8" X 10FT FOR NEPTUNE

## (undated) DEVICE — DRAPE 1/2 SHEET 40X57"

## (undated) DEVICE — ELCTR STRYKER NEPTUNE SMOKE EVACUATION PENCIL (GREEN)

## (undated) DEVICE — POSITIONER FOAM EGG CRATE ULNAR 2PCS (PINK)

## (undated) DEVICE — SUT VICRYL 2-0 27" CP-1 UNDYED

## (undated) DEVICE — STAPLER SKIN PROXIMATE

## (undated) DEVICE — GLV 8 PROTEXIS (WHITE)

## (undated) DEVICE — GLV 7 PROTEXIS (WHITE)

## (undated) DEVICE — MIDAS REX MR8 MATCH HEAD FLUTED LG BORE 3MM X 14CM

## (undated) DEVICE — FOLEY TRAY 16FR 5CC LF UMETER CLOSED

## (undated) DEVICE — DRAPE BACK TABLE COVER 80X90"

## (undated) DEVICE — SYR LUER LOK 10CC

## (undated) DEVICE — TUBING XTRA AAL ASPIRATION AND ANTICOAGULATION LINE 1/4"